# Patient Record
Sex: FEMALE | Race: WHITE | Employment: FULL TIME | URBAN - METROPOLITAN AREA
[De-identification: names, ages, dates, MRNs, and addresses within clinical notes are randomized per-mention and may not be internally consistent; named-entity substitution may affect disease eponyms.]

---

## 2023-06-09 LAB
ABO GROUP (TYPE) IN BLOOD: NORMAL
ANTIBODY SCREEN: NORMAL
ERYTHROCYTE DISTRIBUTION WIDTH (RATIO) BY AUTOMATED COUNT: 11.9 % (ref 11.5–14.5)
ERYTHROCYTE MEAN CORPUSCULAR HEMOGLOBIN CONCENTRATION (G/DL) BY AUTOMATED: 33.4 G/DL (ref 32–36)
ERYTHROCYTE MEAN CORPUSCULAR VOLUME (FL) BY AUTOMATED COUNT: 88 FL (ref 80–100)
ERYTHROCYTES (10*6/UL) IN BLOOD BY AUTOMATED COUNT: 5.08 X10E12/L (ref 4–5.2)
HEMATOCRIT (%) IN BLOOD BY AUTOMATED COUNT: 44.9 % (ref 36–46)
HEMOGLOBIN (G/DL) IN BLOOD: 15 G/DL (ref 12–16)
HEPATITIS B VIRUS SURFACE AG PRESENCE IN SERUM: NONREACTIVE
HEPATITIS C VIRUS AB PRESENCE IN SERUM: NONREACTIVE
HIV 1/ 2 AG/AB SCREEN: NONREACTIVE
LEUKOCYTES (10*3/UL) IN BLOOD BY AUTOMATED COUNT: 7.1 X10E9/L (ref 4.4–11.3)
NRBC (PER 100 WBCS) BY AUTOMATED COUNT: 0 /100 WBC (ref 0–0)
PLATELETS (10*3/UL) IN BLOOD AUTOMATED COUNT: 280 X10E9/L (ref 150–450)
REFLEX ADDED, ANEMIA PANEL: NORMAL
RH FACTOR: NORMAL
RUBELLA VIRUS IGG AB: POSITIVE
SYPHILIS TOTAL AB: NONREACTIVE

## 2023-07-13 LAB — URINE CULTURE: NORMAL

## 2023-07-14 LAB
CHLAMYDIA TRACH., AMPLIFIED: NEGATIVE
N. GONORRHEA, AMPLIFIED: NEGATIVE

## 2023-08-09 LAB — THYROTROPIN (MIU/L) IN SER/PLAS BY DETECTION LIMIT <= 0.05 MIU/L: 2.14 MIU/L (ref 0.44–3.98)

## 2023-08-12 LAB — LAB MOLECULAR CA TECHNICAL NOTES: NORMAL

## 2023-09-11 ENCOUNTER — HOSPITAL ENCOUNTER (OUTPATIENT)
Dept: DATA CONVERSION | Facility: HOSPITAL | Age: 33
Discharge: HOME | End: 2023-09-11
Payer: COMMERCIAL

## 2023-09-11 DIAGNOSIS — M79.604 PAIN IN RIGHT LEG: ICD-10-CM

## 2023-09-14 VITALS — BODY MASS INDEX: 29.87 KG/M2 | DIASTOLIC BLOOD PRESSURE: 68 MMHG | WEIGHT: 162 LBS | SYSTOLIC BLOOD PRESSURE: 116 MMHG

## 2023-09-14 NOTE — PROGRESS NOTES
Initially abstracting data into [facility] pending formal care plan by Dr. Mchugh

## 2023-09-26 DIAGNOSIS — Z34.83 PRENATAL CARE, SUBSEQUENT PREGNANCY IN THIRD TRIMESTER (HHS-HCC): Primary | ICD-10-CM

## 2023-09-29 ENCOUNTER — LAB (OUTPATIENT)
Dept: LAB | Facility: LAB | Age: 33
End: 2023-09-29
Payer: COMMERCIAL

## 2023-09-29 LAB — TOBACCO SCREEN, URINE: NEGATIVE

## 2023-10-06 PROBLEM — N91.2 AMENORRHEA: Status: ACTIVE | Noted: 2023-10-06

## 2023-10-06 PROBLEM — E66.9 OBESITY WITH BODY MASS INDEX 30 OR GREATER: Status: ACTIVE | Noted: 2023-10-06

## 2023-10-06 PROBLEM — N93.0 POSTCOITAL BLEEDING: Status: ACTIVE | Noted: 2023-10-06

## 2023-10-06 PROBLEM — N39.0 URINARY TRACT INFECTION: Status: ACTIVE | Noted: 2023-10-06

## 2023-10-06 PROBLEM — E03.9 HYPOTHYROIDISM: Status: ACTIVE | Noted: 2020-01-21

## 2023-10-06 PROBLEM — R35.0 URINARY FREQUENCY: Status: ACTIVE | Noted: 2023-10-06

## 2023-10-06 RX ORDER — CHOLECALCIFEROL (VITAMIN D3) 50 MCG
2000 TABLET ORAL DAILY
COMMUNITY
Start: 2023-01-16

## 2023-10-06 RX ORDER — PHENTERMINE HYDROCHLORIDE 37.5 MG/1
37.5 CAPSULE ORAL DAILY
COMMUNITY
Start: 2022-06-21 | End: 2023-11-08 | Stop reason: ENTERED-IN-ERROR

## 2023-10-06 RX ORDER — SEMAGLUTIDE 1.34 MG/ML
INJECTION, SOLUTION SUBCUTANEOUS
COMMUNITY
Start: 2022-10-12 | End: 2023-11-08 | Stop reason: ALTCHOICE

## 2023-10-06 RX ORDER — METFORMIN HYDROCHLORIDE 500 MG/1
500 TABLET ORAL 2 TIMES DAILY
COMMUNITY
Start: 2022-11-02 | End: 2023-11-08 | Stop reason: ALTCHOICE

## 2023-10-06 RX ORDER — MAGNESIUM 200 MG
TABLET ORAL
COMMUNITY
Start: 2023-01-16

## 2023-10-06 RX ORDER — THYROID 15 MG/1
15 TABLET ORAL
COMMUNITY

## 2023-10-06 RX ORDER — PHENTERMINE AND TOPIRAMATE 3.75; 23 MG/1; MG/1
1 CAPSULE, EXTENDED RELEASE ORAL DAILY
COMMUNITY
Start: 2022-07-05 | End: 2023-11-08 | Stop reason: ALTCHOICE

## 2023-10-06 RX ORDER — THYROID 30 MG/1
1.5 TABLET ORAL NIGHTLY
COMMUNITY
End: 2023-11-22 | Stop reason: SDUPTHER

## 2023-10-11 ENCOUNTER — ROUTINE PRENATAL (OUTPATIENT)
Dept: OBSTETRICS AND GYNECOLOGY | Facility: CLINIC | Age: 33
End: 2023-10-11
Payer: COMMERCIAL

## 2023-10-11 VITALS — DIASTOLIC BLOOD PRESSURE: 68 MMHG | WEIGHT: 165 LBS | SYSTOLIC BLOOD PRESSURE: 118 MMHG | BODY MASS INDEX: 31.18 KG/M2

## 2023-10-11 DIAGNOSIS — Z3A.24 24 WEEKS GESTATION OF PREGNANCY (HHS-HCC): ICD-10-CM

## 2023-10-11 PROCEDURE — 0501F PRENATAL FLOW SHEET: CPT | Performed by: OBSTETRICS & GYNECOLOGY

## 2023-10-11 NOTE — PROGRESS NOTES
"Subjective   Patient ID [id number]   Luisa Burger is a 32 y.o.  at 24w3d with a working estimated date of delivery of 2024, by Last Menstrual Period who presents for a routine prenatal visit. She denies vaginal bleeding, leakage of fluid, decreased fetal movements, or contractions.    Her pregnancy is complicated by:  History of mild shoulder dystocia.  We reviewed pros and cons of attempted vaginal delivery versus elective .  Her most recent ultrasound showed fetus at the 46 percentile.  She is interested in proceeding with vaginal deliveries.  Will schedule induction at 39 weeks.    Objective   Physical Exam  Weight: 74.8 kg (165 lb)  Expected Total Weight Gain: 7 kg (15 lb)-11.5 kg (25 lb)   Pregravid BMI: 29.30  BP: 118/68  Fetal Heart Rate: 143 Fundal Height (cm): 24 cm    Prenatal Labs  Urine dip:  Lab Results   Component Value Date    KETONESU NEGATIVE 2023       Lab Results   Component Value Date    HGB 15.0 2023    HCT 44.9 2023    HEPBSAG NONREACTIVE 2023     No results found for: \"PAPPA\", \"AFP\", \"HCG\", \"ESTRIOL\", \"INHBA\"  No results found for: \"GLUF\", \"GLUT1\", \"XMBEUKC4JY\", \"PRDOTYC0RX\"    Imaging  The most recent ultrasound was performed on   with a study GA of   and EFW of  .          Assessment/Plan   Glucola.    Review of recent ultrasound.  Normal.  Follow-up as clinically indicated.  46 percentile    Continue prenatal vitamin.  Labs reviewed.  Rhogam not indicated  GTT .    Follow up in 4 weeks for a routine prenatal visit.  "

## 2023-11-06 ENCOUNTER — LAB (OUTPATIENT)
Dept: LAB | Facility: LAB | Age: 33
End: 2023-11-06
Payer: COMMERCIAL

## 2023-11-06 DIAGNOSIS — Z34.83 PRENATAL CARE, SUBSEQUENT PREGNANCY IN THIRD TRIMESTER (HHS-HCC): ICD-10-CM

## 2023-11-06 LAB — GLUCOSE 1H P 50 G GLC PO SERPL-MCNC: 128 MG/DL

## 2023-11-06 PROCEDURE — 82947 ASSAY GLUCOSE BLOOD QUANT: CPT

## 2023-11-06 PROCEDURE — 36415 COLL VENOUS BLD VENIPUNCTURE: CPT

## 2023-11-08 ENCOUNTER — ANCILLARY PROCEDURE (OUTPATIENT)
Dept: OBSTETRICS AND GYNECOLOGY | Facility: CLINIC | Age: 33
End: 2023-11-08
Payer: COMMERCIAL

## 2023-11-08 ENCOUNTER — ROUTINE PRENATAL (OUTPATIENT)
Dept: OBSTETRICS AND GYNECOLOGY | Facility: CLINIC | Age: 33
End: 2023-11-08
Payer: COMMERCIAL

## 2023-11-08 ENCOUNTER — PHARMACY VISIT (OUTPATIENT)
Dept: PHARMACY | Facility: CLINIC | Age: 33
End: 2023-11-08
Payer: COMMERCIAL

## 2023-11-08 VITALS — DIASTOLIC BLOOD PRESSURE: 68 MMHG | WEIGHT: 170 LBS | SYSTOLIC BLOOD PRESSURE: 118 MMHG | BODY MASS INDEX: 32.12 KG/M2

## 2023-11-08 DIAGNOSIS — Z3A.28 28 WEEKS GESTATION OF PREGNANCY (HHS-HCC): ICD-10-CM

## 2023-11-08 DIAGNOSIS — O99.210 OBESITY IN PREGNANCY (HHS-HCC): ICD-10-CM

## 2023-11-08 LAB
POC BLOOD, URINE: NEGATIVE
POC GLUCOSE, URINE: NEGATIVE MG/DL
POC LEUKOCYTES, URINE: NEGATIVE
POC NITRITE,URINE: NEGATIVE
POC PROTEIN, URINE: NEGATIVE MG/DL

## 2023-11-08 PROCEDURE — 81002 URINALYSIS NONAUTO W/O SCOPE: CPT | Performed by: OBSTETRICS & GYNECOLOGY

## 2023-11-08 PROCEDURE — RXMED WILLOW AMBULATORY MEDICATION CHARGE

## 2023-11-08 PROCEDURE — 0501F PRENATAL FLOW SHEET: CPT | Performed by: OBSTETRICS & GYNECOLOGY

## 2023-11-08 PROCEDURE — 76805 OB US >/= 14 WKS SNGL FETUS: CPT | Performed by: OBSTETRICS & GYNECOLOGY

## 2023-11-08 PROCEDURE — 76816 OB US FOLLOW-UP PER FETUS: CPT | Performed by: OBSTETRICS & GYNECOLOGY

## 2023-11-08 RX ORDER — THYROID 30 MG/1
TABLET ORAL
Qty: 135 TABLET | Refills: 10 | OUTPATIENT
Start: 2023-08-16

## 2023-11-08 NOTE — PROGRESS NOTES
"Subjective   Patient ID [id number]   Luisa Burger is a 32 y.o.  at 28w3d with a working estimated date of delivery of 2024, by Last Menstrual Period who presents for a routine prenatal visit. She denies vaginal bleeding, leakage of fluid, decreased fetal movements, or contractions.    Her pregnancy is complicated by:  Obesity in pregnancy.  Varicose veins right leg.  Negative ultrasound for thrombosis.  History of mild shoulder dystocia    Objective   Physical Exam:   Weight: 77.1 kg (170 lb)  Expected Total Weight Gain: 7 kg (15 lb)-11.5 kg (25 lb)   Pregravid BMI: 29.30  BP: 118/68                  Prenatal Labs  Urine Dip:  Lab Results   Component Value Date    KETONESU NEGATIVE 2023     Lab Results   Component Value Date    HGB 15.0 2023    HCT 44.9 2023    ABO B 2023    HEPBSAG NONREACTIVE 2023     No results found for: \"PAPPA\", \"AFP\", \"HCG\", \"ESTRIOL\", \"INHBA\"  No results found for: \"GLUF\", \"GLUT1\", \"NLWAOEB0AP\", \"SZLXCQB6ME\"    Imaging  The most recent ultrasound was performed on 2023 with a study GA of   and EFW of  .          Assessment/Plan   Reminder for flu and Tdap vaccine.  Start weekly NST 34 weeks.  Scheduled for induction at 39 weeks  Continue prenatal vitamin.  Labs reviewed.  Expected mode of delivery vaginal  Follow up in 2 week for a routine prenatal visit.  "

## 2023-11-22 ENCOUNTER — ROUTINE PRENATAL (OUTPATIENT)
Dept: OBSTETRICS AND GYNECOLOGY | Facility: CLINIC | Age: 33
End: 2023-11-22
Payer: COMMERCIAL

## 2023-11-22 VITALS — DIASTOLIC BLOOD PRESSURE: 68 MMHG | BODY MASS INDEX: 32.12 KG/M2 | WEIGHT: 170 LBS | SYSTOLIC BLOOD PRESSURE: 120 MMHG

## 2023-11-22 DIAGNOSIS — Z3A.30 30 WEEKS GESTATION OF PREGNANCY (HHS-HCC): ICD-10-CM

## 2023-11-22 PROCEDURE — 81002 URINALYSIS NONAUTO W/O SCOPE: CPT | Performed by: OBSTETRICS & GYNECOLOGY

## 2023-11-22 PROCEDURE — 0501F PRENATAL FLOW SHEET: CPT | Performed by: OBSTETRICS & GYNECOLOGY

## 2023-11-22 NOTE — PROGRESS NOTES
"Subjective   Patient ID [id number]   Luisa Burger is a 32 y.o.  at 30w3d with a working estimated date of delivery of 2024, by Last Menstrual Period who presents for a routine prenatal visit. She denies vaginal bleeding, leakage of fluid, decreased fetal movements, or contractions.    Her pregnancy is complicated by:  BMI greater than 30.  History of varicose veins.    Objective   Physical Exam:   Weight: 77.1 kg (170 lb)  Expected Total Weight Gain: 7 kg (15 lb)-11.5 kg (25 lb)   Pregravid BMI: 29.30  BP: 120/68                  Prenatal Labs  Urine Dip:  Lab Results   Component Value Date    KETONESU NEGATIVE 2023     Lab Results   Component Value Date    HGB 15.0 2023    HCT 44.9 2023    ABO B 2023    HEPBSAG NONREACTIVE 2023     No results found for: \"PAPPA\", \"AFP\", \"HCG\", \"ESTRIOL\", \"INHBA\"  No results found for: \"GLUF\", \"GLUT1\", \"NOFXTZG8ZS\", \"ARIXHTW7QH\"    Imaging  The most recent ultrasound was performed on 2023 with a study GA of   and EFW of  .          Assessment/Plan   [facility] paperwork submitted.  Routine care.  Importance of fetal movement.  Reviewed vaccinations.  Continue prenatal vitamin.  Labs reviewed.  GBS taken.  Expected mode of delivery vaginal  Follow up in 1 week for a routine prenatal visit.  "

## 2023-12-06 ENCOUNTER — PHARMACY VISIT (OUTPATIENT)
Dept: PHARMACY | Facility: CLINIC | Age: 33
End: 2023-12-06
Payer: COMMERCIAL

## 2023-12-06 ENCOUNTER — ROUTINE PRENATAL (OUTPATIENT)
Dept: OBSTETRICS AND GYNECOLOGY | Facility: CLINIC | Age: 33
End: 2023-12-06
Payer: COMMERCIAL

## 2023-12-06 VITALS — SYSTOLIC BLOOD PRESSURE: 102 MMHG | BODY MASS INDEX: 32.69 KG/M2 | WEIGHT: 173 LBS | DIASTOLIC BLOOD PRESSURE: 70 MMHG

## 2023-12-06 DIAGNOSIS — Z3A.32 32 WEEKS GESTATION OF PREGNANCY (HHS-HCC): ICD-10-CM

## 2023-12-06 PROCEDURE — RXMED WILLOW AMBULATORY MEDICATION CHARGE

## 2023-12-06 PROCEDURE — 0501F PRENATAL FLOW SHEET: CPT | Performed by: OBSTETRICS & GYNECOLOGY

## 2023-12-06 PROCEDURE — 81002 URINALYSIS NONAUTO W/O SCOPE: CPT | Performed by: OBSTETRICS & GYNECOLOGY

## 2023-12-06 NOTE — PROGRESS NOTES
"Subjective   Patient ID [id number]   Luisa Burger is a 32 y.o.  at 32w3d with a working estimated date of delivery of 2024, by Last Menstrual Period who presents for a routine prenatal visit. She denies vaginal bleeding, leakage of fluid, decreased fetal movements, or contractions.    Her pregnancy is complicated by:  BMI greater than 30.  Start weekly NST 34 weeks.  Reminder for Tdap vaccination.    Objective   Physical Exam:   Weight: 78.5 kg (173 lb)  Expected Total Weight Gain: 7 kg (15 lb)-11.5 kg (25 lb)   Pregravid BMI: 29.30  BP: 102/70                  Prenatal Labs  Urine Dip:  Lab Results   Component Value Date    KETONESU NEGATIVE 2023     Lab Results   Component Value Date    HGB 15.0 2023    HCT 44.9 2023    ABO B 2023    HEPBSAG NONREACTIVE 2023     No results found for: \"PAPPA\", \"AFP\", \"HCG\", \"ESTRIOL\", \"INHBA\"  No results found for: \"GLUF\", \"GLUT1\", \"BKGDFGI7WY\", \"JOBEGEQ7MR\"    Imaging  The most recent ultrasound was performed on 2023 with a study GA of   and EFW of  .          Assessment/Plan   History of varicose veins.  Start weekly NST at 34 weeks.  GBS at 36 weeks  Continue prenatal vitamin.  Labs reviewed.  GBS taken.  Expected mode of delivery vaginal  Follow up in 2  week for a routine prenatal visit.  "

## 2023-12-20 ENCOUNTER — APPOINTMENT (OUTPATIENT)
Dept: OBSTETRICS AND GYNECOLOGY | Facility: CLINIC | Age: 33
End: 2023-12-20
Payer: COMMERCIAL

## 2023-12-28 ENCOUNTER — ROUTINE PRENATAL (OUTPATIENT)
Dept: OBSTETRICS AND GYNECOLOGY | Facility: CLINIC | Age: 33
End: 2023-12-28
Payer: COMMERCIAL

## 2023-12-28 VITALS — SYSTOLIC BLOOD PRESSURE: 104 MMHG | BODY MASS INDEX: 32.12 KG/M2 | DIASTOLIC BLOOD PRESSURE: 64 MMHG | WEIGHT: 170 LBS

## 2023-12-28 DIAGNOSIS — Z36.89 NST (NON-STRESS TEST) REACTIVE (HHS-HCC): ICD-10-CM

## 2023-12-28 DIAGNOSIS — Z34.83 SUPERVISION OF NORMAL INTRAUTERINE PREGNANCY IN MULTIGRAVIDA, THIRD TRIMESTER (HHS-HCC): ICD-10-CM

## 2023-12-28 PROCEDURE — 81002 URINALYSIS NONAUTO W/O SCOPE: CPT | Performed by: MIDWIFE

## 2023-12-28 PROCEDURE — 0501F PRENATAL FLOW SHEET: CPT | Performed by: MIDWIFE

## 2023-12-28 PROCEDURE — 59025 FETAL NON-STRESS TEST: CPT | Performed by: MIDWIFE

## 2023-12-28 NOTE — PROGRESS NOTES
Subjective   Patient ID [id number]   Luisa Burger is a 33 y.o.  at 35w4d with a working estimated date of delivery of 2024, by Last Menstrual Period who presents for a routine prenatal visit. She denies vaginal bleeding, leakage of fluid, decreased fetal movements, or contractions.    Her pregnancy is complicated by:  BMI>30    Objective   Physical Exam:   Weight: 77.1 kg (170 lb)  Expected Total Weight Gain: 7 kg (15 lb)-11.5 kg (25 lb)   Pregravid BMI: 29.30  BP: 104/64  Fetal Heart Rate: NST Fundal Height (cm): 35 cm Presentation: Breech           Prenatal Labs  Urine Dip:  Lab Results   Component Value Date    KETONESU NEGATIVE 2023     Lab Results   Component Value Date    HGB 15.0 2023    HCT 44.9 2023    ABO B 2023    HEPBSAG NONREACTIVE 2023     Imaging  The most recent ultrasound was performed on 2023 with a study GA of   and EFW of  .          Assessment/Plan   Diagnoses and all orders for this visit:  Obesity complicating childbirth  Supervision of normal intrauterine pregnancy in multigravida, third trimester  -     POCT UA (nonautomated) manually resulted  -     Fetal nonstress test  NST (non-stress test) reactive  -     Fetal nonstress test    Continue prenatal vitamin.  Labs reviewed.    Expected mode of delivery to be determine based of presentation at term  Importance of daily FM, warning s/sx reviewed  Follow up in 1 week for a routine prenatal visit.

## 2023-12-28 NOTE — PROCEDURES
Luisa Burger, a  at 35w4d with an ROD of 2024, by Last Menstrual Period, was seen at [facility] for a nonstress test.    Non-Stress Test   Baseline Fetal Heart Rate for Non-Stress Test: 130 BPM  Accelerations in Non-Stress Test: Yes, greater than/equal to 15 bpm, lasting at least 15 seconds  Decelerations in Non-Stress Test: None  Interpretation of Non-Stress Test   Interpretation of Non-Stress Test: Reactive

## 2024-01-04 ENCOUNTER — ROUTINE PRENATAL (OUTPATIENT)
Dept: OBSTETRICS AND GYNECOLOGY | Facility: CLINIC | Age: 34
End: 2024-01-04
Payer: COMMERCIAL

## 2024-01-04 VITALS — WEIGHT: 172 LBS | SYSTOLIC BLOOD PRESSURE: 116 MMHG | DIASTOLIC BLOOD PRESSURE: 70 MMHG | BODY MASS INDEX: 32.5 KG/M2

## 2024-01-04 DIAGNOSIS — Z34.83 MULTIGRAVIDA IN THIRD TRIMESTER (HHS-HCC): ICD-10-CM

## 2024-01-04 DIAGNOSIS — Z34.83 PRENATAL CARE, SUBSEQUENT PREGNANCY, THIRD TRIMESTER (HHS-HCC): ICD-10-CM

## 2024-01-04 DIAGNOSIS — R30.0 DYSURIA: ICD-10-CM

## 2024-01-04 DIAGNOSIS — O99.210 OBESITY IN PREGNANCY (HHS-HCC): Primary | ICD-10-CM

## 2024-01-04 DIAGNOSIS — Z36.89 NON-STRESS TEST REACTIVE (HHS-HCC): ICD-10-CM

## 2024-01-04 LAB
POC BLOOD, URINE: ABNORMAL
POC GLUCOSE, URINE: NEGATIVE MG/DL
POC LEUKOCYTES, URINE: ABNORMAL
POC NITRITE,URINE: NEGATIVE
POC PROTEIN, URINE: ABNORMAL MG/DL

## 2024-01-04 PROCEDURE — 81002 URINALYSIS NONAUTO W/O SCOPE: CPT | Performed by: MIDWIFE

## 2024-01-04 PROCEDURE — 59025 FETAL NON-STRESS TEST: CPT | Performed by: MIDWIFE

## 2024-01-04 PROCEDURE — 87081 CULTURE SCREEN ONLY: CPT

## 2024-01-04 PROCEDURE — 0501F PRENATAL FLOW SHEET: CPT | Performed by: MIDWIFE

## 2024-01-04 PROCEDURE — 87086 URINE CULTURE/COLONY COUNT: CPT

## 2024-01-04 NOTE — PROCEDURES
Luisaleeanne Burger, a  at 36w4d with an ROD of 2024, by Last Menstrual Period, was seen at [facility] for a nonstress test.    Non-Stress Test   Baseline Fetal Heart Rate for Non-Stress Test: 130 BPM  Variability in Waveform for Non-Stress Test: Moderate  Accelerations in Non-Stress Test: Yes, greater than/equal to 15 bpm, lasting at least 15 seconds  Decelerations in Non-Stress Test: None  Interpretation of Non-Stress Test   Interpretation of Non-Stress Test: Reactive

## 2024-01-04 NOTE — PROGRESS NOTES
"Subjective   Patient ID [id number]   Luisa Burger is a 33 y.o.  at 36w4d with a working estimated date of delivery of 2024, by Last Menstrual Period who presents for a routine prenatal visit. She denies vaginal bleeding, leakage of fluid, decreased fetal movements, or contractions.    Her pregnancy is complicated by:  Breech; BMI >30    Objective   Physical Exam:   Weight: 78 kg (172 lb)  Expected Total Weight Gain: 7 kg (15 lb)-11.5 kg (25 lb)   Pregravid BMI: 29.30  BP: 116/70  Fetal Heart Rate: NST Fundal Height (cm): 36 cm Presentation: Breech           Prenatal Labs  Urine Dip:  Lab Results   Component Value Date    KETONESU NEGATIVE 2023     Lab Results   Component Value Date    HGB 15.0 2023    HCT 44.9 2023    ABO B 2023    HEPBSAG NONREACTIVE 2023     No results found for: \"PAPPA\", \"AFP\", \"HCG\", \"ESTRIOL\", \"INHBA\"  No results found for: \"GLUF\", \"GLUT1\", \"BTMZCSN5UF\", \"SVAZLQR5PM\"    Imaging  The most recent ultrasound was performed on 2023 with a study GA of   and EFW of  .          Assessment/Plan   Diagnoses and all orders for this visit:  Obesity in pregnancy  Dysuria  -     Urine Culture  Multigravida in third trimester  -     POCT UA (nonautomated) manually resulted  -     Fetal nonstress test  Non-stress test reactive    Continue prenatal vitamin.  Labs reviewed.  GBS taken.  Expected mode of delivery to be determined   Importance of daily Fetal Movement awareness, labor and warning s/sx reviewed    Follow up in 1 week for a routine prenatal visit.  "

## 2024-01-05 LAB — BACTERIA UR CULT: NORMAL

## 2024-01-08 LAB — GP B STREP GENITAL QL CULT: NORMAL

## 2024-01-10 ENCOUNTER — ROUTINE PRENATAL (OUTPATIENT)
Dept: OBSTETRICS AND GYNECOLOGY | Facility: CLINIC | Age: 34
End: 2024-01-10
Payer: COMMERCIAL

## 2024-01-10 VITALS — BODY MASS INDEX: 32.5 KG/M2 | WEIGHT: 172 LBS | DIASTOLIC BLOOD PRESSURE: 70 MMHG | SYSTOLIC BLOOD PRESSURE: 126 MMHG

## 2024-01-10 DIAGNOSIS — Z3A.37 37 WEEKS GESTATION OF PREGNANCY (HHS-HCC): ICD-10-CM

## 2024-01-10 PROCEDURE — 59025 FETAL NON-STRESS TEST: CPT | Performed by: OBSTETRICS & GYNECOLOGY

## 2024-01-10 PROCEDURE — 0501F PRENATAL FLOW SHEET: CPT | Performed by: OBSTETRICS & GYNECOLOGY

## 2024-01-10 PROCEDURE — 81002 URINALYSIS NONAUTO W/O SCOPE: CPT | Performed by: OBSTETRICS & GYNECOLOGY

## 2024-01-10 NOTE — PROGRESS NOTES
"Subjective   Patient ID [id number]   Luisa Burger is a 33 y.o.  at 37w3d with a working estimated date of delivery of 2024, by Last Menstrual Period who presents for a routine prenatal visit. She denies vaginal bleeding, leakage of fluid, decreased fetal movements, or contractions.    Her pregnancy is complicated by:  BMI greater than 30.  Nonstress test reactive.  For vaginal exam next visit and confirm presentation.  Scheduled for induction .  History mild shoulder dystocia with 8 pound infant    Objective   Physical Exam:   Weight: 78 kg (172 lb)  Expected Total Weight Gain: 7 kg (15 lb)-11.5 kg (25 lb)   Pregravid BMI: 29.30  BP: 126/70                  Prenatal Labs  Urine Dip:  Lab Results   Component Value Date    KETONESU NEGATIVE 2023     Lab Results   Component Value Date    HGB 15.0 2023    HCT 44.9 2023    ABO B 2023    HEPBSAG NONREACTIVE 2023     No results found for: \"PAPPA\", \"AFP\", \"HCG\", \"ESTRIOL\", \"INHBA\"  No results found for: \"GLUF\", \"GLUT1\", \"ZEPRVHV6BL\", \"BHMWNHE6EV\"    Imaging  The most recent ultrasound was performed on 2023 with a study GA of   and EFW of  .          Assessment/Plan     Continue prenatal vitamin.  Labs reviewed.  GBS taken.  GBS negative  Expected mode of delivery vaginal  Follow up in 1 week for a routine prenatal visit.  "

## 2024-01-12 ENCOUNTER — APPOINTMENT (OUTPATIENT)
Dept: OBSTETRICS AND GYNECOLOGY | Facility: CLINIC | Age: 34
End: 2024-01-12
Payer: COMMERCIAL

## 2024-01-13 ENCOUNTER — PHARMACY VISIT (OUTPATIENT)
Dept: PHARMACY | Facility: CLINIC | Age: 34
End: 2024-01-13
Payer: COMMERCIAL

## 2024-01-13 PROCEDURE — RXMED WILLOW AMBULATORY MEDICATION CHARGE

## 2024-01-17 ENCOUNTER — ROUTINE PRENATAL (OUTPATIENT)
Dept: OBSTETRICS AND GYNECOLOGY | Facility: CLINIC | Age: 34
End: 2024-01-17
Payer: COMMERCIAL

## 2024-01-17 VITALS — SYSTOLIC BLOOD PRESSURE: 112 MMHG | BODY MASS INDEX: 32.12 KG/M2 | WEIGHT: 170 LBS | DIASTOLIC BLOOD PRESSURE: 70 MMHG

## 2024-01-17 DIAGNOSIS — O99.210 OBESITY IN PREGNANCY (HHS-HCC): ICD-10-CM

## 2024-01-17 DIAGNOSIS — Z3A.38 38 WEEKS GESTATION OF PREGNANCY (HHS-HCC): ICD-10-CM

## 2024-01-17 PROCEDURE — 59025 FETAL NON-STRESS TEST: CPT | Performed by: OBSTETRICS & GYNECOLOGY

## 2024-01-17 PROCEDURE — 0501F PRENATAL FLOW SHEET: CPT | Performed by: OBSTETRICS & GYNECOLOGY

## 2024-01-17 NOTE — PROGRESS NOTES
"Subjective   Patient ID [id number]   Luisa Burger is a 33 y.o.  at 38w3d with a working estimated date of delivery of 2024, by Last Menstrual Period who presents for a routine prenatal visit. She denies vaginal bleeding, leakage of fluid, decreased fetal movements, or contractions.    Her pregnancy is complicated by:  BMI greater than 30.  Nonstress test reactive.    Objective   Physical Exam:   Weight: 77.1 kg (170 lb)  Expected Total Weight Gain: 7 kg (15 lb)-11.5 kg (25 lb)   Pregravid BMI: 29.30  BP: 112/70                  Prenatal Labs  Urine Dip:  Lab Results   Component Value Date    KETONESU NEGATIVE 2023     Lab Results   Component Value Date    HGB 15.0 2023    HCT 44.9 2023    ABO B 2023    HEPBSAG NONREACTIVE 2023     No results found for: \"PAPPA\", \"AFP\", \"HCG\", \"ESTRIOL\", \"INHBA\"  No results found for: \"GLUF\", \"GLUT1\", \"NBMNCJT3JQ\", \"ZTMVOVS8BW\"    Imaging  The most recent ultrasound was performed on 2023 with a study GA of   and EFW of  .          Assessment/Plan   Vaginal exam.  Cervix 50% 1 cm.  Presentation confirmed  Continue prenatal vitamin.  Labs reviewed.  GBS taken.  GBS negative  Expected mode of delivery vaginal  Plan is oral Cytotec followed by Pitocin.  Epidural as needed.  Reviewed risks of fetal heart rate changes, tachysystole, need for emergent or operative delivery  "

## 2024-01-21 ENCOUNTER — HOSPITAL ENCOUNTER (INPATIENT)
Facility: HOSPITAL | Age: 34
LOS: 2 days | Discharge: HOME | End: 2024-01-23
Attending: OBSTETRICS & GYNECOLOGY | Admitting: OBSTETRICS & GYNECOLOGY
Payer: COMMERCIAL

## 2024-01-21 ENCOUNTER — ANESTHESIA (OUTPATIENT)
Dept: OBSTETRICS AND GYNECOLOGY | Facility: HOSPITAL | Age: 34
End: 2024-01-21
Payer: COMMERCIAL

## 2024-01-21 ENCOUNTER — ANESTHESIA EVENT (OUTPATIENT)
Dept: OBSTETRICS AND GYNECOLOGY | Facility: HOSPITAL | Age: 34
End: 2024-01-21
Payer: COMMERCIAL

## 2024-01-21 LAB
ABO GROUP (TYPE) IN BLOOD: NORMAL
ANTIBODY SCREEN: NORMAL
ERYTHROCYTE [DISTWIDTH] IN BLOOD BY AUTOMATED COUNT: 12.3 % (ref 11.5–14.5)
HCT VFR BLD AUTO: 42.6 % (ref 36–46)
HGB BLD-MCNC: 14.5 G/DL (ref 12–16)
MCH RBC QN AUTO: 29.9 PG (ref 26–34)
MCHC RBC AUTO-ENTMCNC: 34 G/DL (ref 32–36)
MCV RBC AUTO: 88 FL (ref 80–100)
NRBC BLD-RTO: 0 /100 WBCS (ref 0–0)
PLATELET # BLD AUTO: 185 X10*3/UL (ref 150–450)
RBC # BLD AUTO: 4.85 X10*6/UL (ref 4–5.2)
RH FACTOR (ANTIGEN D): NORMAL
WBC # BLD AUTO: 9.4 X10*3/UL (ref 4.4–11.3)

## 2024-01-21 PROCEDURE — 2500000001 HC RX 250 WO HCPCS SELF ADMINISTERED DRUGS (ALT 637 FOR MEDICARE OP): Performed by: OBSTETRICS & GYNECOLOGY

## 2024-01-21 PROCEDURE — 59400 OBSTETRICAL CARE: CPT | Performed by: OBSTETRICS & GYNECOLOGY

## 2024-01-21 PROCEDURE — 2500000004 HC RX 250 GENERAL PHARMACY W/ HCPCS (ALT 636 FOR OP/ED): Performed by: OBSTETRICS & GYNECOLOGY

## 2024-01-21 PROCEDURE — A59410 PR OBSTE CARE,VAG DELIV+POSTPARTUM: Performed by: NURSE ANESTHETIST, CERTIFIED REGISTERED

## 2024-01-21 PROCEDURE — 59409 OBSTETRICAL CARE: CPT | Performed by: OBSTETRICS & GYNECOLOGY

## 2024-01-21 PROCEDURE — 2500000004 HC RX 250 GENERAL PHARMACY W/ HCPCS (ALT 636 FOR OP/ED): Performed by: NURSE ANESTHETIST, CERTIFIED REGISTERED

## 2024-01-21 PROCEDURE — 85027 COMPLETE CBC AUTOMATED: CPT | Performed by: OBSTETRICS & GYNECOLOGY

## 2024-01-21 PROCEDURE — 36415 COLL VENOUS BLD VENIPUNCTURE: CPT | Performed by: OBSTETRICS & GYNECOLOGY

## 2024-01-21 PROCEDURE — 2500000001 HC RX 250 WO HCPCS SELF ADMINISTERED DRUGS (ALT 637 FOR MEDICARE OP)

## 2024-01-21 PROCEDURE — 1120000001 HC OB PRIVATE ROOM DAILY

## 2024-01-21 PROCEDURE — 86901 BLOOD TYPING SEROLOGIC RH(D): CPT | Performed by: OBSTETRICS & GYNECOLOGY

## 2024-01-21 PROCEDURE — 51701 INSERT BLADDER CATHETER: CPT

## 2024-01-21 RX ORDER — ONDANSETRON HYDROCHLORIDE 2 MG/ML
4 INJECTION, SOLUTION INTRAVENOUS EVERY 6 HOURS PRN
Status: DISCONTINUED | OUTPATIENT
Start: 2024-01-21 | End: 2024-01-23 | Stop reason: HOSPADM

## 2024-01-21 RX ORDER — METHYLERGONOVINE MALEATE 0.2 MG/ML
0.2 INJECTION INTRAVENOUS ONCE AS NEEDED
Status: DISCONTINUED | OUTPATIENT
Start: 2024-01-21 | End: 2024-01-23 | Stop reason: HOSPADM

## 2024-01-21 RX ORDER — NIFEDIPINE 10 MG/1
10 CAPSULE ORAL ONCE AS NEEDED
Status: DISCONTINUED | OUTPATIENT
Start: 2024-01-21 | End: 2024-01-23 | Stop reason: HOSPADM

## 2024-01-21 RX ORDER — SIMETHICONE 80 MG
80 TABLET,CHEWABLE ORAL 4 TIMES DAILY PRN
Status: DISCONTINUED | OUTPATIENT
Start: 2024-01-21 | End: 2024-01-23 | Stop reason: HOSPADM

## 2024-01-21 RX ORDER — LOPERAMIDE HYDROCHLORIDE 2 MG/1
4 CAPSULE ORAL EVERY 2 HOUR PRN
Status: DISCONTINUED | OUTPATIENT
Start: 2024-01-21 | End: 2024-01-23 | Stop reason: HOSPADM

## 2024-01-21 RX ORDER — METOCLOPRAMIDE 10 MG/1
10 TABLET ORAL EVERY 6 HOURS PRN
Status: DISCONTINUED | OUTPATIENT
Start: 2024-01-21 | End: 2024-01-23 | Stop reason: HOSPADM

## 2024-01-21 RX ORDER — CARBOPROST TROMETHAMINE 250 UG/ML
250 INJECTION, SOLUTION INTRAMUSCULAR ONCE AS NEEDED
Status: DISCONTINUED | OUTPATIENT
Start: 2024-01-21 | End: 2024-01-23 | Stop reason: HOSPADM

## 2024-01-21 RX ORDER — MISOPROSTOL 200 UG/1
800 TABLET ORAL ONCE AS NEEDED
Status: DISCONTINUED | OUTPATIENT
Start: 2024-01-21 | End: 2024-01-23 | Stop reason: HOSPADM

## 2024-01-21 RX ORDER — THYROID 30 MG/1
15 TABLET ORAL
Status: DISCONTINUED | OUTPATIENT
Start: 2024-01-22 | End: 2024-01-22

## 2024-01-21 RX ORDER — LABETALOL HYDROCHLORIDE 5 MG/ML
20 INJECTION, SOLUTION INTRAVENOUS ONCE AS NEEDED
Status: DISCONTINUED | OUTPATIENT
Start: 2024-01-21 | End: 2024-01-23 | Stop reason: HOSPADM

## 2024-01-21 RX ORDER — OXYTOCIN/0.9 % SODIUM CHLORIDE 30/500 ML
60 PLASTIC BAG, INJECTION (ML) INTRAVENOUS ONCE AS NEEDED
Status: DISCONTINUED | OUTPATIENT
Start: 2024-01-21 | End: 2024-01-23 | Stop reason: HOSPADM

## 2024-01-21 RX ORDER — ONDANSETRON 4 MG/1
4 TABLET, FILM COATED ORAL EVERY 6 HOURS PRN
Status: DISCONTINUED | OUTPATIENT
Start: 2024-01-21 | End: 2024-01-23 | Stop reason: HOSPADM

## 2024-01-21 RX ORDER — TERBUTALINE SULFATE 1 MG/ML
0.25 INJECTION SUBCUTANEOUS ONCE AS NEEDED
Status: DISCONTINUED | OUTPATIENT
Start: 2024-01-21 | End: 2024-01-23 | Stop reason: HOSPADM

## 2024-01-21 RX ORDER — OXYTOCIN/0.9 % SODIUM CHLORIDE 30/500 ML
2-30 PLASTIC BAG, INJECTION (ML) INTRAVENOUS CONTINUOUS
Status: DISCONTINUED | OUTPATIENT
Start: 2024-01-21 | End: 2024-01-23 | Stop reason: HOSPADM

## 2024-01-21 RX ORDER — OXYTOCIN 10 [USP'U]/ML
10 INJECTION, SOLUTION INTRAMUSCULAR; INTRAVENOUS ONCE AS NEEDED
Status: DISCONTINUED | OUTPATIENT
Start: 2024-01-21 | End: 2024-01-23 | Stop reason: HOSPADM

## 2024-01-21 RX ORDER — SODIUM CHLORIDE, SODIUM LACTATE, POTASSIUM CHLORIDE, CALCIUM CHLORIDE 600; 310; 30; 20 MG/100ML; MG/100ML; MG/100ML; MG/100ML
125 INJECTION, SOLUTION INTRAVENOUS CONTINUOUS
Status: DISCONTINUED | OUTPATIENT
Start: 2024-01-21 | End: 2024-01-23 | Stop reason: HOSPADM

## 2024-01-21 RX ORDER — BUPIVACAINE HYDROCHLORIDE 2.5 MG/ML
INJECTION, SOLUTION EPIDURAL; INFILTRATION; INTRACAUDAL AS NEEDED
Status: DISCONTINUED | OUTPATIENT
Start: 2024-01-21 | End: 2024-01-21

## 2024-01-21 RX ORDER — IBUPROFEN 600 MG/1
600 TABLET ORAL EVERY 6 HOURS
Status: DISCONTINUED | OUTPATIENT
Start: 2024-01-21 | End: 2024-01-23 | Stop reason: HOSPADM

## 2024-01-21 RX ORDER — DIPHENHYDRAMINE HYDROCHLORIDE 50 MG/ML
25 INJECTION INTRAMUSCULAR; INTRAVENOUS EVERY 6 HOURS PRN
Status: DISCONTINUED | OUTPATIENT
Start: 2024-01-21 | End: 2024-01-23 | Stop reason: HOSPADM

## 2024-01-21 RX ORDER — LIDOCAINE 560 MG/1
1 PATCH PERCUTANEOUS; TOPICAL; TRANSDERMAL
Status: DISCONTINUED | OUTPATIENT
Start: 2024-01-21 | End: 2024-01-23 | Stop reason: HOSPADM

## 2024-01-21 RX ORDER — NALBUPHINE HYDROCHLORIDE 10 MG/ML
10 INJECTION, SOLUTION INTRAMUSCULAR; INTRAVENOUS; SUBCUTANEOUS
Status: DISCONTINUED | OUTPATIENT
Start: 2024-01-21 | End: 2024-01-23 | Stop reason: HOSPADM

## 2024-01-21 RX ORDER — LIDOCAINE HYDROCHLORIDE 10 MG/ML
30 INJECTION INFILTRATION; PERINEURAL ONCE AS NEEDED
Status: DISCONTINUED | OUTPATIENT
Start: 2024-01-21 | End: 2024-01-23 | Stop reason: HOSPADM

## 2024-01-21 RX ORDER — BUTORPHANOL TARTRATE 2 MG/ML
1 INJECTION INTRAMUSCULAR; INTRAVENOUS EVERY 10 MIN PRN
Status: DISCONTINUED | OUTPATIENT
Start: 2024-01-21 | End: 2024-01-23 | Stop reason: HOSPADM

## 2024-01-21 RX ORDER — METOCLOPRAMIDE HYDROCHLORIDE 5 MG/ML
10 INJECTION INTRAMUSCULAR; INTRAVENOUS EVERY 6 HOURS PRN
Status: DISCONTINUED | OUTPATIENT
Start: 2024-01-21 | End: 2024-01-23 | Stop reason: HOSPADM

## 2024-01-21 RX ORDER — HYDRALAZINE HYDROCHLORIDE 20 MG/ML
5 INJECTION INTRAMUSCULAR; INTRAVENOUS ONCE AS NEEDED
Status: DISCONTINUED | OUTPATIENT
Start: 2024-01-21 | End: 2024-01-23 | Stop reason: HOSPADM

## 2024-01-21 RX ORDER — ACETAMINOPHEN 325 MG/1
975 TABLET ORAL EVERY 6 HOURS
Status: DISCONTINUED | OUTPATIENT
Start: 2024-01-21 | End: 2024-01-23 | Stop reason: HOSPADM

## 2024-01-21 RX ORDER — POLYETHYLENE GLYCOL 3350 17 G/17G
17 POWDER, FOR SOLUTION ORAL 2 TIMES DAILY PRN
Status: DISCONTINUED | OUTPATIENT
Start: 2024-01-21 | End: 2024-01-23 | Stop reason: HOSPADM

## 2024-01-21 RX ORDER — DIPHENHYDRAMINE HCL 25 MG
25 CAPSULE ORAL EVERY 6 HOURS PRN
Status: DISCONTINUED | OUTPATIENT
Start: 2024-01-21 | End: 2024-01-23 | Stop reason: HOSPADM

## 2024-01-21 RX ORDER — FENTANYL/ROPIVACAINE/NS/PF 2MCG/ML-.2
0-25 PLASTIC BAG, INJECTION (ML) INJECTION CONTINUOUS
Status: DISCONTINUED | OUTPATIENT
Start: 2024-01-21 | End: 2024-01-23 | Stop reason: HOSPADM

## 2024-01-21 RX ORDER — TRANEXAMIC ACID 100 MG/ML
1000 INJECTION, SOLUTION INTRAVENOUS ONCE AS NEEDED
Status: DISCONTINUED | OUTPATIENT
Start: 2024-01-21 | End: 2024-01-23 | Stop reason: HOSPADM

## 2024-01-21 RX ORDER — BISACODYL 10 MG/1
10 SUPPOSITORY RECTAL DAILY PRN
Status: DISCONTINUED | OUTPATIENT
Start: 2024-01-21 | End: 2024-01-23 | Stop reason: HOSPADM

## 2024-01-21 RX ORDER — ADHESIVE BANDAGE
10 BANDAGE TOPICAL
Status: DISCONTINUED | OUTPATIENT
Start: 2024-01-21 | End: 2024-01-23 | Stop reason: HOSPADM

## 2024-01-21 RX ADMIN — Medication 2 MILLI-UNITS/MIN: at 16:47

## 2024-01-21 RX ADMIN — IBUPROFEN 600 MG: 600 TABLET, FILM COATED ORAL at 22:31

## 2024-01-21 RX ADMIN — MISOPROSTOL 25 MCG: 100 TABLET ORAL at 10:40

## 2024-01-21 RX ADMIN — MISOPROSTOL 25 MCG: 100 TABLET ORAL at 12:49

## 2024-01-21 RX ADMIN — Medication 15 ML/HR: at 18:25

## 2024-01-21 RX ADMIN — BUPIVACAINE HYDROCHLORIDE 8 ML: 2.5 INJECTION, SOLUTION EPIDURAL; INFILTRATION; INTRACAUDAL; PERINEURAL at 18:15

## 2024-01-21 RX ADMIN — BUPIVACAINE HYDROCHLORIDE 4 ML: 2.5 INJECTION, SOLUTION EPIDURAL; INFILTRATION; INTRACAUDAL; PERINEURAL at 18:23

## 2024-01-21 RX ADMIN — BUPIVACAINE HYDROCHLORIDE 4 ML: 2.5 INJECTION, SOLUTION EPIDURAL; INFILTRATION; INTRACAUDAL; PERINEURAL at 18:20

## 2024-01-21 RX ADMIN — SODIUM CHLORIDE, POTASSIUM CHLORIDE, SODIUM LACTATE AND CALCIUM CHLORIDE 125 ML/HR: 600; 310; 30; 20 INJECTION, SOLUTION INTRAVENOUS at 10:30

## 2024-01-21 RX ADMIN — SODIUM CHLORIDE, POTASSIUM CHLORIDE, SODIUM LACTATE AND CALCIUM CHLORIDE 125 ML/HR: 600; 310; 30; 20 INJECTION, SOLUTION INTRAVENOUS at 18:58

## 2024-01-21 RX ADMIN — ACETAMINOPHEN 975 MG: 325 TABLET ORAL at 22:30

## 2024-01-21 RX ADMIN — Medication 2 MILLI-UNITS/MIN: at 19:52

## 2024-01-21 SDOH — SOCIAL STABILITY: SOCIAL INSECURITY: DO YOU FEEL ANYONE HAS EXPLOITED OR TAKEN ADVANTAGE OF YOU FINANCIALLY OR OF YOUR PERSONAL PROPERTY?: NO

## 2024-01-21 SDOH — SOCIAL STABILITY: SOCIAL INSECURITY: HAS ANYONE EVER THREATENED TO HURT YOUR FAMILY OR YOUR PETS?: NO

## 2024-01-21 SDOH — HEALTH STABILITY: MENTAL HEALTH: CURRENT SMOKER: 0

## 2024-01-21 SDOH — SOCIAL STABILITY: SOCIAL INSECURITY: VERBAL ABUSE: DENIES

## 2024-01-21 SDOH — SOCIAL STABILITY: SOCIAL INSECURITY: ARE THERE ANY APPARENT SIGNS OF INJURIES/BEHAVIORS THAT COULD BE RELATED TO ABUSE/NEGLECT?: NO

## 2024-01-21 SDOH — ECONOMIC STABILITY: HOUSING INSECURITY: DO YOU FEEL UNSAFE GOING BACK TO THE PLACE WHERE YOU ARE LIVING?: NO

## 2024-01-21 SDOH — HEALTH STABILITY: MENTAL HEALTH: NON-SPECIFIC ACTIVE SUICIDAL THOUGHTS (PAST 1 MONTH): NO

## 2024-01-21 SDOH — SOCIAL STABILITY: SOCIAL INSECURITY: DOES ANYONE TRY TO KEEP YOU FROM HAVING/CONTACTING OTHER FRIENDS OR DOING THINGS OUTSIDE YOUR HOME?: NO

## 2024-01-21 SDOH — SOCIAL STABILITY: SOCIAL INSECURITY: ARE YOU OR HAVE YOU BEEN THREATENED OR ABUSED PHYSICALLY, EMOTIONALLY, OR SEXUALLY BY ANYONE?: NO

## 2024-01-21 SDOH — HEALTH STABILITY: MENTAL HEALTH: WERE YOU ABLE TO COMPLETE ALL THE BEHAVIORAL HEALTH SCREENINGS?: YES

## 2024-01-21 SDOH — SOCIAL STABILITY: SOCIAL INSECURITY: HAVE YOU HAD THOUGHTS OF HARMING ANYONE ELSE?: NO

## 2024-01-21 SDOH — HEALTH STABILITY: MENTAL HEALTH: HAVE YOU USED ANY SUBSTANCES (CANABIS, COCAINE, HEROIN, HALLUCINOGENS, INHALANTS, ETC.) IN THE PAST 12 MONTHS?: NO

## 2024-01-21 SDOH — HEALTH STABILITY: MENTAL HEALTH: WISH TO BE DEAD (PAST 1 MONTH): NO

## 2024-01-21 SDOH — HEALTH STABILITY: MENTAL HEALTH: SUICIDAL BEHAVIOR (LIFETIME): NO

## 2024-01-21 SDOH — SOCIAL STABILITY: SOCIAL INSECURITY: ABUSE SCREEN: ADULT

## 2024-01-21 SDOH — HEALTH STABILITY: MENTAL HEALTH: HAVE YOU USED ANY PRESCRIPTION DRUGS OTHER THAN PRESCRIBED IN THE PAST 12 MONTHS?: NO

## 2024-01-21 SDOH — SOCIAL STABILITY: SOCIAL INSECURITY: PHYSICAL ABUSE: DENIES

## 2024-01-21 ASSESSMENT — PAIN SCALES - GENERAL
PAINLEVEL_OUTOF10: 0 - NO PAIN

## 2024-01-21 ASSESSMENT — LIFESTYLE VARIABLES
SKIP TO QUESTIONS 9-10: 1
AUDIT-C TOTAL SCORE: 0
AUDIT-C TOTAL SCORE: 0
HOW MANY STANDARD DRINKS CONTAINING ALCOHOL DO YOU HAVE ON A TYPICAL DAY: PATIENT DOES NOT DRINK
HOW OFTEN DO YOU HAVE 6 OR MORE DRINKS ON ONE OCCASION: NEVER
HOW OFTEN DO YOU HAVE A DRINK CONTAINING ALCOHOL: NEVER

## 2024-01-21 ASSESSMENT — PATIENT HEALTH QUESTIONNAIRE - PHQ9
1. LITTLE INTEREST OR PLEASURE IN DOING THINGS: NOT AT ALL
2. FEELING DOWN, DEPRESSED OR HOPELESS: NOT AT ALL
SUM OF ALL RESPONSES TO PHQ9 QUESTIONS 1 & 2: 0

## 2024-01-21 ASSESSMENT — ACTIVITIES OF DAILY LIVING (ADL): LACK_OF_TRANSPORTATION: PATIENT DECLINED

## 2024-01-21 ASSESSMENT — PAIN - FUNCTIONAL ASSESSMENT: PAIN_FUNCTIONAL_ASSESSMENT: 0-10

## 2024-01-21 NOTE — ANESTHESIA PROCEDURE NOTES
Epidural Block    Patient location during procedure: OB  Start time: 1/21/2024 6:00 PM  End time: 1/21/2024 6:11 PM  Reason for block: labor analgesia  Staffing  Performed: CRNA   Authorized by: JAMES Dixon    Performed by: JAMES Dixon    Preanesthetic Checklist  Completed: patient identified, IV checked, risks and benefits discussed, surgical consent, pre-op evaluation, timeout performed and sterile techniques followed  Block Timeout  RN/Licensed healthcare professional reads aloud to the Anesthesia provider and entire team: Patient identity, procedure with side and site, patient position, and as applicable the availability of implants/special equipment/special requirements.  Patient on coagulant treatment: no  Timeout performed at: 1/21/2024 5:50 PM  Block Placement  Patient position: sitting  Prep: ChloraPrep  Sterility prep: drape, gloves and mask  Sedation level: no sedation  Patient monitoring: blood pressure, continuous pulse oximetry and heart rate  Approach: midline  Local numbing: lidocaine 1% to skin and subcutaneous tissues  Vertebral space: lumbar  Lumbar location: L4-L5  Epidural  Loss of resistance technique: saline  Guidance: landmark technique        Needle  Needle type: Tuohy   Needle gauge: 18  Catheter type: multi-orifice  Catheter size: 20 G  Catheter at skin depth: 14 cm  Catheter securement method: clear occlusive dressing    Test dose: lidocaine 1.5% with epinephrine 1-to-200,000  Test dose given at 1/21/2024 6:11 PM  Test dose: lidocaine 1.5% with epinephrine 1-to-200,000  Test dose result: no positive test dose            Assessment  Sensory level: T6 bilateral  Block outcome: patient comfortable  Number of attempts: 2 (see note)  Events: no positive test dose  Procedure assessment: patient tolerated procedure well with no immediate complications  Additional Notes  Patient was prepped and draped, given informed consent and procedural timeout was performed  at 1750- upon sitting the patient stated she thought the baby's head was coming out so the procedure was aborted and patient was laid back for a cervical exam.  Dr. Avendaño determined that the pt exam was 3-4 cm dilated and patient said she wished to sit back up and attempt the epidural again.  The above procedure note indicates the successful attempt at epidural placement.  Pt tolerated the procedure well. Pt pain was significantly improved.

## 2024-01-21 NOTE — CARE PLAN
Problem: Vaginal Birth or  Section  Goal: Fetal and maternal status remain reassuring during the birth process  Outcome: Progressing  Goal: Tolerate CRB for IOL placement maintenance until dislodgement/removal 12hrs after placement  Outcome: Progressing  Goal: Prevention of malpresentation/labor dystocia through delivery  Outcome: Progressing  Goal: Demonstrates labor coping techniques through delivery  Outcome: Progressing  Goal: Minimal s/sx of HDP and BP<160/110  Outcome: Progressing  Goal: No s/sx of infection through recovery  Outcome: Progressing  Goal: No s/sx of hemorrhage through recovery  Outcome: Progressing     Problem: Pain - Adult  Goal: Verbalizes/displays adequate comfort level or baseline comfort level  Outcome: Progressing     Problem: Safety - Adult  Goal: Free from fall injury  Outcome: Progressing   The patient's goals for the shift include safe delivery    The clinical goals for the shift include FHR tracing will remain reassuring throughout induction of labor      Cytotec X2, pitocin per protocol started. Plan for epidural. Pt desires to take placenta home. Plans for no erythromycin ointment for . Ok for vitamin K. Plans skin to skin and breastfeeding.

## 2024-01-21 NOTE — ANESTHESIA PREPROCEDURE EVALUATION
Patient: Luisa Burger    Evaluation Method: In-person visit    Procedure Information    Date: 01/21/24  Procedure: Labor Analgesia         Relevant Problems   Endocrine   (+) Hypothyroidism       Clinical information reviewed:   Tobacco  Allergies  Meds   Med Hx  Surg Hx   Fam Hx  Soc Hx        NPO Detail:  No data recorded     OB/Gyn Evaluation    Present Pregnancy    Patient is pregnant now.   Obstetric History                Physical Exam    Airway  Mallampati: II  TM distance: >3 FB  Neck ROM: full     Cardiovascular   Rate: normal     Dental - normal exam     Pulmonary    Abdominal            Anesthesia Plan    History of general anesthesia?: no  History of complications of general anesthesia?: no    ASA 2     epidural     The patient is not a current smoker.  Patient was not previously instructed to abstain from smoking on day of procedure.  Patient did not smoke on day of procedure.    Anesthetic plan and risks discussed with patient and spouse.    Plan discussed with CRNA.

## 2024-01-21 NOTE — H&P
Obstetrical Admission History and Physical     Luisa Burger is a 33 y.o.  at 39w0d. ROD: 2024, by Last Menstrual Period. Estimated fetal weight: 3500. She has [facility] with [facility] .for   iol hx   shoulder   dystocia last    delivery  ni   dm   prenatal   neg   aller  neg  meds    thyroid  pmh   hypothyroid    psh   femur  br   aug  smoke  etoh  drugs   neg   gbs   neg  epidural   prn      Chief Complaint: Scheduled Induction    Assessment/Plan      39 weeks   for   iol   po   cytotec       Active Problems:  There are no active Hospital Problems.      Pregnancy Problems (from 22 to present)       No problems associated with this episode.          Options for delivery have been discussed with the patient and she elects for an induction of labor.  Cervical ripening with cytotec, cervidil, other prostaglandin agents has been discussed.  Induction of labor with pitocin, amniotomy, cytotec, and cervical balloon have been discussed in detail. The risks, benefits, complications, alternatives, expected outcomes, potential problems during recuperation and recovery, and the risks of not performing the procedure were discussed with the patient. The patient stated understanding that the risks of delivery include, but are not limited to: death; reaction to medications; injury to bowel, bladder, ureters, uterus, cervix, vagina, and other pelvic and abdominal structures, infection; blood loss and possible need for transfusion; and potential need for surgery, including hysterectomy. The risks of injury to the infant during delivery were also discussed. All questions were answered. There was concurrence with the planned procedure, and the patient wanted to proceed.    Admit to inpatient status. I anticipate that this patient will require a stay exceeding at least 2 midnights for delivery and postpartum.  Induction of labor.  Management of pregnancy complications, as indicated.    Subjective   Good  fetal movement. Denies contractions.     Reason for Induction of Labor:  Pregnancy at 39 weeks or greater for induction    Po    cytotec       Obstetrical History   OB History    Para Term  AB Living   7 3 3   3 3   SAB IAB Ectopic Multiple Live Births   1       3      # Outcome Date GA Lbr Jordy/2nd Weight Sex Delivery Anes PTL Lv   7 Current            6 Term 21 40w0d  3.629 kg M Vag-Spont EPI N ALLEN      Complications: Shoulder Dystocia   5 SAB 21           4 Term 17 38w0d  3.345 kg F Vag-Spont  N ALLEN   3 Term 11/12/15 38w0d  3.005 kg M Vag-Spont EPI N ALLEN   2 AB  4w0d          1 AB  4w0d              Past Medical History  Past Medical History:   Diagnosis Date    HPV (human papilloma virus) infection     + HPV age 15    Thyroid dysfunction     hx hashimotos tx armourthyroid 60mg        Past Surgical History   Past Surgical History:   Procedure Laterality Date    LEG SURGERY Left        Social History  Social History     Tobacco Use    Smoking status: Never    Smokeless tobacco: Never   Substance Use Topics    Alcohol use: Not Currently     Substance and Sexual Activity   Drug Use Never       Allergies  Patient has no known allergies.     Medications  Medications Prior to Admission   Medication Sig Dispense Refill Last Dose    cholecalciferol (Vitamin D-3) 50 MCG (2000 UT) tablet Take 1 tablet (2,000 Units) by mouth once daily.       magnesium 200 mg tablet 0 Refill(s), Type: Maintenance       PNV no.95/ferrous fum/folic ac (PRENATAL ORAL) Take 1 capsule by mouth once daily.       thyroid, pork, (Verplanck Thyroid) 15 mg tablet Take 1 tablet (15 mg) by mouth once daily in the morning. Take before meals.       thyroid, pork, (Verplanck Thyroid) 30 mg tablet Take 1.5 tablets by mouth every night on an empty stomach 135 tablet 10     VITAMIN B COMPLEX ORAL Take 1 tablet by mouth once daily.       diphth,pertus,acell,,tetanus (BoostRIX) 2.5-8-5 Lf-mcg-Lf/0.5mL injection Inject into  the muscle. 0.5 mL 1        Objective    Last Vitals  Temp Pulse Resp BP MAP O2 Sat   37.3 °C (99.2 °F) 78 18 113/70   97 %     Physical Examination  Heart   rrr  lungs   cta    abd  soft   n/t   cx   3/50/-1/vtx  bp   nl   plan  po   cytotec      Lab Review

## 2024-01-21 NOTE — LACTATION NOTE
Lactation Consultant Note  Lactation Consultation  Reason for Consult: Initial assessment  Consultant Name: CHRISTINE MarrCLC    Maternal Information  Has mother  before?: Yes  How long did the mother previously breastfeed?: 6 months with her first two, then 8 months with her last child  Previous Maternal Breastfeeding Challenges: Other (Comment) (Had breast augmentation in 2018 but had no issues with supply or feeding her last baby which is now 3yo,  for 8m)    Maternal Assessment  Breast Assessment:  (Breast Augmentation 2018)    Infant Assessment       Feeding Assessment       LATCH TOOL       Breast Pump       Other OB Lactation Tools       Patient Follow-up  Inpatient Lactation Follow-up Needed : Yes    Other OB Lactation Documentation  Maternal Risk Factors: History of breast surgery (Breast augmentation 2018)    Recommendations/Summary  , experienced breastfeeding mother in labor. Reports breastfeeding her first 2 children for 6m with no issues then breastfeeding her last for 8m (this was after she had a breast augmentation in 2018- no issues with breastfeeding or supply). Mother reports positive breast changes with pregnancy,  will be returning to work in 3m and has several personal breast pumps for home use. Breastfeeding handouts provided, education reviewed. Reviewed importance of frequent skin to skin contact, infant stomach capacity, value of colostrum feeds and typical  feeding behaviors in the first 24 hours. Encouraged frequent skin to skin and nursing with cues and at least 8 times in the first 24 hours. Reviewed signs breastfeeding is going well. Offered ongoing assistance with breastfeeding as needed.

## 2024-01-22 PROCEDURE — 51701 INSERT BLADDER CATHETER: CPT

## 2024-01-22 PROCEDURE — 59050 FETAL MONITOR W/REPORT: CPT

## 2024-01-22 PROCEDURE — 7100000016 HC LABOR RECOVERY PER HOUR

## 2024-01-22 PROCEDURE — 2500000001 HC RX 250 WO HCPCS SELF ADMINISTERED DRUGS (ALT 637 FOR MEDICARE OP): Performed by: OBSTETRICS & GYNECOLOGY

## 2024-01-22 PROCEDURE — 7210000002 HC LABOR PER HOUR

## 2024-01-22 PROCEDURE — 1120000001 HC OB PRIVATE ROOM DAILY

## 2024-01-22 RX ORDER — THYROID 30 MG/1
45 TABLET ORAL
Status: DISCONTINUED | OUTPATIENT
Start: 2024-01-23 | End: 2024-01-23 | Stop reason: HOSPADM

## 2024-01-22 RX ADMIN — IBUPROFEN 600 MG: 600 TABLET, FILM COATED ORAL at 16:22

## 2024-01-22 RX ADMIN — ACETAMINOPHEN 975 MG: 325 TABLET ORAL at 22:11

## 2024-01-22 RX ADMIN — IBUPROFEN 600 MG: 600 TABLET, FILM COATED ORAL at 10:21

## 2024-01-22 RX ADMIN — IBUPROFEN 600 MG: 600 TABLET, FILM COATED ORAL at 22:10

## 2024-01-22 RX ADMIN — ACETAMINOPHEN 975 MG: 325 TABLET ORAL at 04:03

## 2024-01-22 RX ADMIN — IBUPROFEN 600 MG: 600 TABLET, FILM COATED ORAL at 04:03

## 2024-01-22 RX ADMIN — LEVOTHYROXINE, LIOTHYRONINE 45 MG: 19; 4.5 TABLET ORAL at 08:42

## 2024-01-22 RX ADMIN — ACETAMINOPHEN 975 MG: 325 TABLET ORAL at 10:21

## 2024-01-22 RX ADMIN — ACETAMINOPHEN 975 MG: 325 TABLET ORAL at 16:22

## 2024-01-22 ASSESSMENT — PAIN SCALES - GENERAL
PAINLEVEL_OUTOF10: 2
PAINLEVEL_OUTOF10: 0 - NO PAIN
PAINLEVEL_OUTOF10: 2
PAIN_LEVEL: 0
PAINLEVEL_OUTOF10: 0 - NO PAIN
PAINLEVEL_OUTOF10: 4
PAINLEVEL_OUTOF10: 3

## 2024-01-22 ASSESSMENT — PAIN DESCRIPTION - LOCATION
LOCATION: ABDOMEN
LOCATION: BACK
LOCATION: BACK

## 2024-01-22 ASSESSMENT — PAIN - FUNCTIONAL ASSESSMENT
PAIN_FUNCTIONAL_ASSESSMENT: 0-10

## 2024-01-22 ASSESSMENT — PAIN DESCRIPTION - ORIENTATION
ORIENTATION: LOWER
ORIENTATION: LOWER

## 2024-01-22 NOTE — SIGNIFICANT EVENT
Dr echols aware of mild range pressure. Pt very uncomfortable awaiting epidural placement.   
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 You will be transferred to [facility] for further management.

## 2024-01-22 NOTE — L&D DELIVERY NOTE
OB Delivery Note  2024  Luisa Burger  33 y.o.   Vaginal, Spontaneous        Gestational Age: 39w0d  /Para:   Quantitative Blood Loss: Admission to Discharge: 0 mL (2024  8:20 AM - 2024  9:48 PM)    Asael Burger [[id number]]      Labor Events    Rupture date/time: 2024 1715  Rupture type: Spontaneous  Fluid color: Clear  Fluid odor: None  Complications: None       Placenta    Placenta delivery date/time:   Placenta removal:        Lacerations    Episiotomy: None        Delivery    Birth date/time: 2025 21:32:00  Delivery type: Vaginal, Spontaneous  Complications: None       Apgars    Living status:   Apgar Component Scores:  1 min.:  5 min.:  10 min.:  15 min.:  20 min.:    Skin color:         Heart rate:         Reflex irritability:         Muscle tone:         Respiratory effort:         Total:                Delivery Providers    Delivering clinician: Efraín Mchugh MD   Provider Role     Delivery Nurse     Nursery Nurse     Resident                 Efraín Mchugh MD

## 2024-01-22 NOTE — NURSING NOTE
Full sensation returned, patient up to bathroom with stand by assist, able to void, denies dizziness or lightheadedness.  Patient up to shower. Clothing and linens changed.

## 2024-01-22 NOTE — LACTATION NOTE
This note was copied from a baby's chart.  Lactation Consultant Note  Lactation Consultation  Reason for Consult: Initial assessment  Consultant Name: ANU Dhillon    Maternal Information  Has mother  before?: Yes  How long did the mother previously breastfeed?: 6 months, 6 months, and 8 months    Maternal Assessment       Infant Assessment  Infant Behavior: Deep sleep    Feeding Assessment  Nutrition Source: Breastmilk  Feeding Method: Nursing at the breast    LATCH TOOL       Breast Pump       Other OB Lactation Tools       Patient Follow-up       Other OB Lactation Documentation  Maternal Risk Factors: History of breast surgery    Recommendations/Summary   experienced breastfeeding mother and infant. Mother states that she has breast fed all of her children. Mother reports that she has had breast augmentation, but has nursed her most recent child since then without any milk supply issues. Mother states that her supply was actually best with that infant. Mother endorses positive breast changes with this pregnancy.   Infant is eating well per mother and is seemingly satisfied after feedings. Reviewed milk supply coming in around day 3 of life. Reviewed what a deep and comfortable latch should look and feel like. Mother states that infants latch is good and she denies any pain or discomfort. Infant eating regularly at this time. Mother confident with breastfeeding and denies needing any assistance from lactation at this time. Mother to call with any questions or concerns.

## 2024-01-22 NOTE — ANESTHESIA POSTPROCEDURE EVALUATION
Patient: Luisa Burger    Procedure Summary       Date: 01/21/24 Room / Location:     Anesthesia Start: 1750 Anesthesia Stop: 2132    Procedure: Labor Analgesia Diagnosis:     Scheduled Providers:  Responsible Provider: JAMES Dixon    Anesthesia Type: epidural ASA Status: 2            Anesthesia Type: epidural    Visit Vitals  /61   Pulse 66   Temp 36.7 °C (98.1 °F) (Temporal)   Resp 16        Anesthesia Post Evaluation    Patient location during evaluation: bedside  Patient participation: complete - patient participated  Level of consciousness: awake and alert  Pain score: 0  Pain management: satisfactory to patient  Multimodal analgesia pain management approach  Airway patency: patent  Two or more strategies used to mitigate risk of obstructive sleep apnea  Cardiovascular status: acceptable  Respiratory status: acceptable  Hydration status: acceptable  Postoperative Nausea and Vomiting: none        No notable events documented.

## 2024-01-22 NOTE — CARE PLAN
The patient's goals for the shift include safe delivery    The clinical goals for the shift include FHR tracing will remain reassuring throughout induction of labor    Over the shift, the patient did not make progress toward the following goals. Barriers to progression include none. Recommendations to address these barriers include none.

## 2024-01-23 ENCOUNTER — LACTATION ENCOUNTER (OUTPATIENT)
Age: 34
End: 2024-01-23

## 2024-01-23 VITALS
TEMPERATURE: 98.2 F | WEIGHT: 172.4 LBS | HEIGHT: 61 IN | SYSTOLIC BLOOD PRESSURE: 121 MMHG | BODY MASS INDEX: 32.55 KG/M2 | HEART RATE: 59 BPM | OXYGEN SATURATION: 97 % | DIASTOLIC BLOOD PRESSURE: 68 MMHG | RESPIRATION RATE: 18 BRPM

## 2024-01-23 PROBLEM — N91.2 AMENORRHEA: Status: RESOLVED | Noted: 2023-10-06 | Resolved: 2024-01-23

## 2024-01-23 PROBLEM — N93.0 POSTCOITAL BLEEDING: Status: RESOLVED | Noted: 2023-10-06 | Resolved: 2024-01-23

## 2024-01-23 PROBLEM — R35.0 URINARY FREQUENCY: Status: RESOLVED | Noted: 2023-10-06 | Resolved: 2024-01-23

## 2024-01-23 PROCEDURE — 99239 HOSP IP/OBS DSCHRG MGMT >30: CPT | Performed by: OBSTETRICS & GYNECOLOGY

## 2024-01-23 PROCEDURE — 2500000001 HC RX 250 WO HCPCS SELF ADMINISTERED DRUGS (ALT 637 FOR MEDICARE OP): Performed by: OBSTETRICS & GYNECOLOGY

## 2024-01-23 RX ORDER — ACETAMINOPHEN 325 MG/1
975 TABLET ORAL EVERY 6 HOURS
Qty: 100 TABLET | Refills: 0
Start: 2024-01-23

## 2024-01-23 RX ORDER — IBUPROFEN 600 MG/1
600 TABLET ORAL EVERY 6 HOURS
Qty: 100 TABLET | Refills: 0
Start: 2024-01-23 | End: 2024-03-12 | Stop reason: ALTCHOICE

## 2024-01-23 RX ADMIN — ACETAMINOPHEN 975 MG: 325 TABLET ORAL at 05:11

## 2024-01-23 RX ADMIN — IBUPROFEN 600 MG: 600 TABLET, FILM COATED ORAL at 05:12

## 2024-01-23 ASSESSMENT — PAIN - FUNCTIONAL ASSESSMENT: PAIN_FUNCTIONAL_ASSESSMENT: 0-10

## 2024-01-23 NOTE — DISCHARGE SUMMARY
Discharge Summary    Admission Date: 2024  Discharge Date: 24    Discharge Diagnosis  Normal labor and delivery    Hospital Course  Delivery Date: 2024  9:32 PM   Delivery type: Vaginal, Spontaneous    GA at delivery: 39w0d  Outcome: Living   Anesthesia during delivery: Epidural   Intrapartum complications: None   Feeding method: Breastfeeding Status: Yes     Procedures:    Contraception at discharge: will be discussed at postpartum visit      Pertinent Physical Exam At Time of Discharge    General: Examination reveals a well developed, well nourished, female, in no acute distress. She is alert and cooperative.  Lungs: appropriate effort.  Fundus: firm, below umbilicus, and nontender.  Psychological: awake and alert; oriented to person, place, and time.    Discharge Meds     Your medication list        START taking these medications        Instructions Last Dose Given Next Dose Due   acetaminophen 325 mg tablet  Commonly known as: Tylenol      Take 3 tablets (975 mg) by mouth every 6 hours.       ibuprofen 600 mg tablet      Take 1 tablet (600 mg) by mouth every 6 hours.              CONTINUE taking these medications        Instructions Last Dose Given Next Dose Due   [facility] Thyroid 15 mg tablet  Generic drug: thyroid (pork)           [facility] Thyroid 30 mg tablet  Generic drug: thyroid (pork)      Take 1.5 tablets by mouth every night on an empty stomach       cholecalciferol 50 MCG (2000 UT) tablet  Commonly known as: Vitamin D-3           magnesium 200 mg tablet           PRENATAL ORAL           VITAMIN B COMPLEX ORAL                  STOP taking these medications      Boostrix Tdap 2.5-8-5 Lf-mcg-Lf/0.5mL injection  Generic drug: diphth,pertus(acell),tetanus                  Where to Get Your Medications        Information about where to get these medications is not yet available    Ask your nurse or doctor about these medications  acetaminophen 325 mg tablet  ibuprofen 600 mg tablet           Complications Requiring Follow-Up  None/routine postpartum care    Test Results Pending At Discharge  Pending Labs       No current pending labs.            Outpatient Follow-Up  RTO 6 weeks    I spent 35 minutes in the professional and overall care of this patient.      Kiera Thacker, DO

## 2024-01-23 NOTE — PROGRESS NOTES
Postpartum Progress Note    Assessment/Plan   Luisa Burger is a 33 y.o., , who delivered at 39w0d gestation and is now postpartum day 2.    Continue routine postpartum care  Ibuprofen/Tylenol for pain management  Regular diet  Encouraged ambulation  SCD/Lovenox for DVT prophylaxis    Discharge home with precautions and RTO 6 weeks    Principal Problem:    Normal labor and delivery    Pregnancy Problems (from 23 to present)       No problems associated with this episode.          Hospital course: no complications      Subjective   Her pain is well controlled with current medications  She is passing flatus  She is ambulating well  She is tolerating a Adult diet Regular  She reports no breast or nursing problems  She denies emotional concerns today   Her plan for contraception will be discussed at postpartum visit         Objective   Allergies:   Patient has no known allergies.         Last Vitals:  Temp Pulse Resp BP MAP Pulse Ox   36.4 °C (97.5 °F) 59 18 121/68   97 %     Vitals Min/Max Last 24 Hours:  Temp  Min: 36.4 °C (97.5 °F)  Max: 36.9 °C (98.4 °F)  Pulse  Min: 56  Max: 67  Resp  Min: 15  Max: 18  BP  Min: 110/57  Max: 121/68    Intake/Output:   No intake or output data in the 24 hours ending 24 0848    Physical Exam:  General: Examination reveals a well developed, well nourished, female, in no acute distress. She is alert and cooperative.  Lungs: appropriate effort.  Fundus: firm, below umbilicus, and nontender.  Psychological: awake and alert; oriented to person, place, and time.    Lab Data:  Lab Results   Component Value Date    WBC 9.4 2024    HGB 14.5 2024    HCT 42.6 2024     2024   Kiera Thacker DO

## 2024-01-23 NOTE — CARE PLAN
The patient's goals for the shift include getting adequate rest and pain control.     The clinical goals for the shift include Normal postpartum course with appropriate bonding with infant, as well as stable vitals, lochia and pain control.

## 2024-01-23 NOTE — CARE PLAN
The patient's goals for the shift include safe delivery    The clinical goals for the shift include Normal postpartum course with appropriate bonding with infant, as well as stable vitals, lochia and pain control.    Pt VSS and lochia WNL. PPWS and discharge reviewed with patient.

## 2024-01-23 NOTE — LACTATION NOTE
"This note was copied from a baby's chart.  Lactation Consultant Note  Lactation Consultation  Reason for Consult: Follow-up assessment  Consultant Name: Nacho Whaley    Maternal Information  Has mother  before?: Yes  How long did the mother previously breastfeed?: up to 8 months, all 3 of her children at home  Previous Maternal Breastfeeding Challenges: None  Exclusive Pump and Bottle Feed: No  WIC Program: No    Maternal Assessment  Breast Assessment: Medium, Symmetrical, Compressible  Nipple Assessment: Sore, Bleeding/cracked, Scabbed (mother reports right nipple has a \"piece of skin hanging\", reviewed and assisted with deep latch techniques on left breast, reviewed nipple care, encouraged close monitoring for healing and signs/symptoms of infection)  Alterations in Nipple Condition: Stage II - abrasions, shallow crack/fissure, stripe  [place] Assessment: Normal    Infant Assessment  Infant Behavior:  (intermittently sleepy, nurses well with stimulation)  Infant Assessment:  (full term infant, approximately 37 hours old, weight loss 3.2%, above adequate output per mother)    Feeding Assessment  Nutrition Source: Breastmilk  Feeding Method: Nursing at the breast  Suck/Feeding: Sustained, Coordinated suck/swallow/breathe, Tactile stimulation needed (active suck, visible swallowing)  Latch Assessment: Minimal assistance is needed, Instructed on deep latch, Sucking and swallowing, Sucks with long jaw movement, Lower lip turned in, Pain during feeding    LATCH TOOL  Latch: Grasps breast, tongue down, lips flanged, rhythmic sucking  Audible Swallowing: Spontaneous and intermittent (24 hours old)  Type of Nipple: Everted (After stimulation)  Comfort (Breast/Nipple): Filling, red/small blisters/bruises, mild/moderate discomfort  Hold (Positioning): Minimal assist, teach one side, mother does other, staff holds  LATCH Score: 8    Breast Pump  Pump:  (has personal breast pump for home use, will return to work after " "3 months, works 2 days a week from home and then the remaining days in office)    Other OB Lactation Tools  Lactation Tools: Lanolin    Patient Follow-up  Inpatient Lactation Follow-up Needed : No  Outpatient Lactation Follow-up:  (recommended if right nipple does not appear to be healing or if mother needs/desires outpatient assistance)  Lactation Professional - OK to Discharge: Yes    Other OB Lactation Documentation  Maternal Risk Factors: History of breast surgery (break down in right nipple)  Infant Risk Factors:  (intermittent shallow latching and pain with feedings)    Recommendations/Summary  33 year old  experienced breastfeeding mother and infant preparing for discharge. Met with parents for lactation consult to assess breastfeeding progress, to address any questions and/or concerns and to offer lactation assistance, support and education as needed and desired prior to discharge. Mother reports feedings are improving. Reports hx of a crack in right nipple resulting in \"skin hanging\". Observed mother positioning infant on Boppy well and in alignment, however, not holding infant in close to breast to allow for deepest latch and optimal comfort and milk removal.  Reviewed and assisted with deep latch reminders and techniques. Deeper, more comfortable latch achieved. Lower lip adjusted as needed. Mother verbalizes increased comfort and confidence prior to discharge.     Breastfeeding written and verbal discharge education reviewed throughout consult. Parents provided with the opportunity to ask questions. Mother asking questions regarding breastfeeding and pumping options now that she has 4 children and her next oldest is 2 years old. Provided support, education and encouragement. All questions answered. See education flow sheet for detailed list of education topics covered. Reviewed importance of frequent skin to skin contact, waking techniques, infant stomach capacity, value of breast milk feeds as well " as typical  feeding patterns and behaviors in the first few weeks of life. Encouraged frequent skin to skin and nursing with cues and at least 8-12 times or more per 24 hours. Reviewed signs of adequate intake/output. Reviewed resources for lactation follow-up and support after discharge. Parents deny any further questions or concerns at this time. Verbalize confidence with breastfeeding prior to discharge. Offered ongoing breastfeeding assistance, support and education as needed and desired.

## 2024-01-23 NOTE — DISCHARGE INSTRUCTIONS
"After Your Delivery Discharge Instructions    After Discharge Orders:  Make an appointment for postpartum follow up in the office in 6 weeks    Medical equipment: Breast pump       After your delivery - signs and symptoms to watch for:  Fever - Oral temperature greater than 100.4 degrees Fahrenheit  Foul-smelling vaginal discharge  Headache unrelieved by \"pain meds\"  Difficulty urinating  Breasts reddened, hard, hot to the touch  Nipple discharge which is foul-smelling or contains pus  Increased pain at the site of the laceration  Difficulty breathing with or without chest pain  New calf pain especially if only on one side  Sudden, continuing increased vaginal bleeding with or without clots  Unrelieved feelings of:  Inability to cope  Sadness  Anxiety  Lack of interest in baby  Insomnia  Crying     What to do at home:  See patient education handouts for full information  Resume activity gradually   Don't lift anything heavier than baby and carrier until OK'd by your Physician or Midwife  No sex until OK'd by your Physician or Midwife  Take care of yourself by sleeping/resting as much as possible  Eat regular nutritious meals  Let someone else care for you, your baby, and housework as much as possible   Take pain medication as prescribed whenever you need them  Wear compression stockings if prescribed   To avoid/relieve constipation take stool softeners if advised   Drink lots of water/fruit juices  Increase fiber in your diet  Breast care: Wear support bra ; use lanolin ointment/cream, nipple shields, or cool compresses as needed     Refer to [place] Discharge Instructions for problems or follow-up regarding  nursing  "

## 2024-03-12 ENCOUNTER — POSTPARTUM VISIT (OUTPATIENT)
Dept: OBSTETRICS AND GYNECOLOGY | Facility: CLINIC | Age: 34
End: 2024-03-12
Payer: COMMERCIAL

## 2024-03-12 VITALS
HEIGHT: 61 IN | WEIGHT: 162 LBS | SYSTOLIC BLOOD PRESSURE: 116 MMHG | DIASTOLIC BLOOD PRESSURE: 64 MMHG | BODY MASS INDEX: 30.58 KG/M2

## 2024-03-12 PROCEDURE — 0503F POSTPARTUM CARE VISIT: CPT | Performed by: OBSTETRICS & GYNECOLOGY

## 2024-03-12 NOTE — PROGRESS NOTES
Postpartum Progress Note    Assessment/Plan   Luisa Burger is a 33 y.o., , who delivered at 39w0d gestation and is now postpartum day 51.    No issues    Active Problems:  There are no active Hospital Problems.    Pregnancy Problems (from 23 to present)       No problems associated with this episode.          Hospital course: no complications       Subjective   Her pain is well controlled with current medications  She is passing flatus  She is ambulating well  She is tolerating a No diet orders on file  She reports no breast or nursing problems  She denies emotional concerns today   Her plan for contraception is vasectomy         Objective   Allergies:   Patient has no known allergies.         Last Vitals:  Temp Pulse Resp BP MAP Pulse Ox         116/64         Vitals Min/Max Last 24 Hours:  @FLOWSTAT(6,8,9,5,[id number]:24::1)@    Intake/Output:   [unfilled]    Physical Exam:  General: Examination reveals a well developed, well nourished, female, in no acute distress. She is alert and cooperative.    Lab Data:  Lab Results   Component Value Date    WBC 9.4 2024    HGB 14.5 2024    HCT 42.6 2024     2024

## 2024-04-30 PROCEDURE — RXMED WILLOW AMBULATORY MEDICATION CHARGE

## 2024-05-06 ENCOUNTER — PHARMACY VISIT (OUTPATIENT)
Dept: PHARMACY | Facility: CLINIC | Age: 34
End: 2024-05-06
Payer: COMMERCIAL

## 2024-06-11 ENCOUNTER — PHARMACY VISIT (OUTPATIENT)
Dept: PHARMACY | Facility: CLINIC | Age: 34
End: 2024-06-11
Payer: COMMERCIAL

## 2024-06-11 PROCEDURE — RXMED WILLOW AMBULATORY MEDICATION CHARGE

## 2024-06-11 RX ORDER — CEPHALEXIN 500 MG/1
500 CAPSULE ORAL EVERY 8 HOURS
Qty: 30 CAPSULE | Refills: 0 | OUTPATIENT
Start: 2024-06-11 | End: 2024-06-21

## 2024-06-11 RX ORDER — METHYLPREDNISOLONE 4 MG/1
TABLET ORAL
Qty: 21 TABLET | Refills: 0 | OUTPATIENT
Start: 2024-06-11

## 2024-06-11 RX ORDER — CYCLOBENZAPRINE HCL 10 MG
TABLET ORAL
Qty: 20 TABLET | Refills: 0 | OUTPATIENT
Start: 2024-06-11

## 2024-06-13 DIAGNOSIS — Z00.00 HEALTHCARE MAINTENANCE: Primary | ICD-10-CM

## 2024-06-13 DIAGNOSIS — R63.5 WEIGHT GAIN: ICD-10-CM

## 2024-06-13 DIAGNOSIS — E06.3 HASHIMOTO'S THYROIDITIS: ICD-10-CM

## 2024-06-13 NOTE — PROGRESS NOTES
Luisa reports difficulty with weight loss after lifestyle modifications.  Recommend to check updated labs re: metabolic and thyroid function in patient with Hashimoto's thyroiditis.

## 2024-07-24 ENCOUNTER — APPOINTMENT (OUTPATIENT)
Dept: OBSTETRICS AND GYNECOLOGY | Facility: CLINIC | Age: 34
End: 2024-07-24
Payer: COMMERCIAL

## 2024-08-22 ENCOUNTER — LAB (OUTPATIENT)
Dept: LAB | Facility: LAB | Age: 34
End: 2024-08-22
Payer: COMMERCIAL

## 2024-08-22 ENCOUNTER — PHARMACY VISIT (OUTPATIENT)
Dept: PHARMACY | Facility: CLINIC | Age: 34
End: 2024-08-22
Payer: COMMERCIAL

## 2024-08-22 DIAGNOSIS — R63.5 WEIGHT GAIN: ICD-10-CM

## 2024-08-22 DIAGNOSIS — E06.3 HASHIMOTO'S THYROIDITIS: ICD-10-CM

## 2024-08-22 DIAGNOSIS — Z00.00 HEALTHCARE MAINTENANCE: ICD-10-CM

## 2024-08-22 LAB
25(OH)D3 SERPL-MCNC: 41 NG/ML (ref 30–100)
ALBUMIN SERPL BCP-MCNC: 4.6 G/DL (ref 3.4–5)
ALP SERPL-CCNC: 75 U/L (ref 33–110)
ALT SERPL W P-5'-P-CCNC: 14 U/L (ref 7–45)
ANION GAP SERPL CALC-SCNC: 12 MMOL/L (ref 10–20)
AST SERPL W P-5'-P-CCNC: 16 U/L (ref 9–39)
BASOPHILS # BLD AUTO: 0.05 X10*3/UL (ref 0–0.1)
BASOPHILS NFR BLD AUTO: 0.8 %
BILIRUB SERPL-MCNC: 0.6 MG/DL (ref 0–1.2)
BUN SERPL-MCNC: 16 MG/DL (ref 6–23)
CALCIUM SERPL-MCNC: 9.7 MG/DL (ref 8.6–10.6)
CHLORIDE SERPL-SCNC: 102 MMOL/L (ref 98–107)
CHOLEST SERPL-MCNC: 188 MG/DL (ref 0–199)
CHOLESTEROL/HDL RATIO: 2.8
CO2 SERPL-SCNC: 29 MMOL/L (ref 21–32)
CREAT SERPL-MCNC: 0.84 MG/DL (ref 0.5–1.05)
EGFRCR SERPLBLD CKD-EPI 2021: >90 ML/MIN/1.73M*2
EOSINOPHIL # BLD AUTO: 0.11 X10*3/UL (ref 0–0.7)
EOSINOPHIL NFR BLD AUTO: 1.8 %
ERYTHROCYTE [DISTWIDTH] IN BLOOD BY AUTOMATED COUNT: 11.9 % (ref 11.5–14.5)
GLUCOSE SERPL-MCNC: 88 MG/DL (ref 74–99)
HCT VFR BLD AUTO: 44.2 % (ref 36–46)
HDLC SERPL-MCNC: 66.2 MG/DL
HGB BLD-MCNC: 14.9 G/DL (ref 12–16)
IMM GRANULOCYTES # BLD AUTO: 0.02 X10*3/UL (ref 0–0.7)
IMM GRANULOCYTES NFR BLD AUTO: 0.3 % (ref 0–0.9)
INSULIN P FAST SERPL-ACNC: 12 UIU/ML (ref 3–25)
LDLC SERPL CALC-MCNC: 87 MG/DL
LYMPHOCYTES # BLD AUTO: 1.77 X10*3/UL (ref 1.2–4.8)
LYMPHOCYTES NFR BLD AUTO: 28.2 %
MCH RBC QN AUTO: 30 PG (ref 26–34)
MCHC RBC AUTO-ENTMCNC: 33.7 G/DL (ref 32–36)
MCV RBC AUTO: 89 FL (ref 80–100)
MONOCYTES # BLD AUTO: 0.44 X10*3/UL (ref 0.1–1)
MONOCYTES NFR BLD AUTO: 7 %
NEUTROPHILS # BLD AUTO: 3.89 X10*3/UL (ref 1.2–7.7)
NEUTROPHILS NFR BLD AUTO: 61.9 %
NON HDL CHOLESTEROL: 122 MG/DL (ref 0–149)
NRBC BLD-RTO: 0 /100 WBCS (ref 0–0)
PLATELET # BLD AUTO: 335 X10*3/UL (ref 150–450)
POTASSIUM SERPL-SCNC: 4.5 MMOL/L (ref 3.5–5.3)
PROT SERPL-MCNC: 7.7 G/DL (ref 6.4–8.2)
RBC # BLD AUTO: 4.97 X10*6/UL (ref 4–5.2)
SODIUM SERPL-SCNC: 138 MMOL/L (ref 136–145)
T3FREE SERPL-MCNC: 2.8 PG/ML (ref 2.3–4.2)
T4 FREE SERPL-MCNC: 0.93 NG/DL (ref 0.78–1.48)
THYROPEROXIDASE AB SERPL-ACNC: >1000 IU/ML
TRIGL SERPL-MCNC: 176 MG/DL (ref 0–149)
TSH SERPL-ACNC: 13.36 MIU/L (ref 0.44–3.98)
VIT B12 SERPL-MCNC: 756 PG/ML (ref 211–911)
VLDL: 35 MG/DL (ref 0–40)
WBC # BLD AUTO: 6.3 X10*3/UL (ref 4.4–11.3)

## 2024-08-22 PROCEDURE — 84443 ASSAY THYROID STIM HORMONE: CPT

## 2024-08-22 PROCEDURE — 84481 FREE ASSAY (FT-3): CPT

## 2024-08-22 PROCEDURE — 36415 COLL VENOUS BLD VENIPUNCTURE: CPT

## 2024-08-22 PROCEDURE — RXMED WILLOW AMBULATORY MEDICATION CHARGE

## 2024-08-22 PROCEDURE — 85025 COMPLETE CBC W/AUTO DIFF WBC: CPT

## 2024-08-22 PROCEDURE — 83525 ASSAY OF INSULIN: CPT

## 2024-08-22 PROCEDURE — 80061 LIPID PANEL: CPT

## 2024-08-22 PROCEDURE — 86376 MICROSOMAL ANTIBODY EACH: CPT

## 2024-08-22 PROCEDURE — 84439 ASSAY OF FREE THYROXINE: CPT

## 2024-08-22 PROCEDURE — 82607 VITAMIN B-12: CPT

## 2024-08-22 PROCEDURE — 82306 VITAMIN D 25 HYDROXY: CPT

## 2024-08-22 PROCEDURE — 80053 COMPREHEN METABOLIC PANEL: CPT

## 2024-08-22 RX ORDER — LIOTHYRONINE SODIUM 25 UG/1
25 TABLET ORAL DAILY
Qty: 30 TABLET | Refills: 2 | OUTPATIENT
Start: 2024-08-22

## 2024-08-22 RX ORDER — LEVOTHYROXINE SODIUM 88 UG/1
88 TABLET ORAL EVERY MORNING
Qty: 30 TABLET | Refills: 2 | OUTPATIENT
Start: 2024-08-22

## 2024-09-26 PROCEDURE — RXMED WILLOW AMBULATORY MEDICATION CHARGE

## 2024-10-05 ENCOUNTER — PHARMACY VISIT (OUTPATIENT)
Dept: PHARMACY | Facility: CLINIC | Age: 34
End: 2024-10-05
Payer: COMMERCIAL

## 2024-10-05 PROCEDURE — RXMED WILLOW AMBULATORY MEDICATION CHARGE

## 2024-10-07 ENCOUNTER — APPOINTMENT (OUTPATIENT)
Dept: PRIMARY CARE | Facility: CLINIC | Age: 34
End: 2024-10-07
Payer: COMMERCIAL

## 2024-10-07 VITALS
OXYGEN SATURATION: 96 % | HEIGHT: 61 IN | DIASTOLIC BLOOD PRESSURE: 69 MMHG | BODY MASS INDEX: 29.28 KG/M2 | HEART RATE: 74 BPM | SYSTOLIC BLOOD PRESSURE: 107 MMHG

## 2024-10-07 DIAGNOSIS — E03.9 HYPOTHYROIDISM, UNSPECIFIED TYPE: ICD-10-CM

## 2024-10-07 DIAGNOSIS — Z00.00 WELL ADULT EXAM: Primary | ICD-10-CM

## 2024-10-07 PROCEDURE — 1036F TOBACCO NON-USER: CPT

## 2024-10-07 PROCEDURE — 99385 PREV VISIT NEW AGE 18-39: CPT

## 2024-10-07 RX ORDER — LIOTHYRONINE SODIUM 25 UG/1
25 TABLET ORAL DAILY
Qty: 30 TABLET | Refills: 2 | Status: SHIPPED | OUTPATIENT
Start: 2024-10-07

## 2024-10-07 RX ORDER — LEVOTHYROXINE SODIUM 88 UG/1
88 TABLET ORAL DAILY
COMMUNITY

## 2024-10-07 RX ORDER — LEVOTHYROXINE SODIUM 88 UG/1
88 TABLET ORAL EVERY MORNING
Qty: 30 TABLET | Refills: 2 | Status: SHIPPED | OUTPATIENT
Start: 2024-10-07

## 2024-10-07 ASSESSMENT — PATIENT HEALTH QUESTIONNAIRE - PHQ9
2. FEELING DOWN, DEPRESSED OR HOPELESS: NOT AT ALL
SUM OF ALL RESPONSES TO PHQ9 QUESTIONS 1 AND 2: 0
1. LITTLE INTEREST OR PLEASURE IN DOING THINGS: NOT AT ALL

## 2024-10-07 NOTE — ASSESSMENT & PLAN NOTE
Orders:    liothyronine (Cytomel) 25 mcg tablet; Take 1 tablet (25 mcg) by mouth once daily in the afternoon    levothyroxine (Synthroid, Levoxyl) 88 mcg tablet; Take 1 tablet (88 mcg) by mouth once daily in the morning.    Tsh With Reflex To Free T4 If Abnormal; Future    T3, free; Future    Vitamin D 25-Hydroxy,Total (for eval of Vitamin D levels); Future    Vitamin B12; Future    Ferritin; Future

## 2024-10-07 NOTE — PROGRESS NOTES
Subjective   Reason for Visit: Luisa Burger is an 33 y.o. female here for a CPE     Chief Complaint   Patient presents with    New Patient Visit    Annual Exam     Refill on thyroid medication        HPI  Luisa  Pt in today for physical. Overall no issues or concerns. She had a baby a couple months ago. She has a hx of hasimotos. She needs refills on her thyroid medications and needs up to date labs. Denies vision changes, chest pain, heart palpitations, dizziness, nausea, vomiting, diarrhea, constipation, or urinary changes.       Visit date not found    Current Providers  Specialists: I have reviewed specialist-related care of the patient in the medical record.     #CPE   Occupation:  Nurse Practitioner    Do you take any herbs or supplements that were not prescribed by a doctor? Vitamin d, thyroid, vitamin B12,   Are you taking calcium supplements? NO  Are you taking aspirin daily? no    Having a colonoscopy in Nov after sister had a poylp.     LMP: sept 12  PAP: 2 years  Mammogram:    GYN:   Sexually active: yes  # Partners:1  STI screening:     Fasting blood work:     Last eye exam: 2023  Last dental Exam: 2024  Exercise: Does peloton,   Mood: No issues or concerns  Sleep: This week feels that sleep is better  Vaccines:       Health Maintenance Due   Topic Date Due    Yearly Adult Physical  Never done    HPV Vaccines (3 - 3-dose series) 08/07/2008    Influenza Vaccine (1) 09/01/2024    COVID-19 Vaccine (2 - 2023-24 season) 09/01/2024       No Known Allergies            Lab on 08/22/2024   Component Date Value Ref Range Status    Glucose 08/22/2024 88  74 - 99 mg/dL Final    Sodium 08/22/2024 138  136 - 145 mmol/L Final    Potassium 08/22/2024 4.5  3.5 - 5.3 mmol/L Final    Chloride 08/22/2024 102  98 - 107 mmol/L Final    Bicarbonate 08/22/2024 29  21 - 32 mmol/L Final    Anion Gap 08/22/2024 12  10 - 20 mmol/L Final    Urea Nitrogen 08/22/2024 16  6 - 23 mg/dL Final    Creatinine 08/22/2024 0.84  0.50 -  1.05 mg/dL Final    eGFR 08/22/2024 >90  >60 mL/min/1.73m*2 Final    Calculations of estimated GFR are performed using the 2021 CKD-EPI Study Refit equation without the race variable for the IDMS-Traceable creatinine methods.  https://jasn.asnjournals.org/content/early/2021/09/22/ASN.6920381964    Calcium 08/22/2024 9.7  8.6 - 10.6 mg/dL Final    Albumin 08/22/2024 4.6  3.4 - 5.0 g/dL Final    Alkaline Phosphatase 08/22/2024 75  33 - 110 U/L Final    Total Protein 08/22/2024 7.7  6.4 - 8.2 g/dL Final    AST 08/22/2024 16  9 - 39 U/L Final    Bilirubin, Total 08/22/2024 0.6  0.0 - 1.2 mg/dL Final    ALT 08/22/2024 14  7 - 45 U/L Final    Patients treated with Sulfasalazine may generate falsely decreased results for ALT.    WBC 08/22/2024 6.3  4.4 - 11.3 x10*3/uL Final    nRBC 08/22/2024 0.0  0.0 - 0.0 /100 WBCs Final    RBC 08/22/2024 4.97  4.00 - 5.20 x10*6/uL Final    Hemoglobin 08/22/2024 14.9  12.0 - 16.0 g/dL Final    Hematocrit 08/22/2024 44.2  36.0 - 46.0 % Final    MCV 08/22/2024 89  80 - 100 fL Final    MCH 08/22/2024 30.0  26.0 - 34.0 pg Final    MCHC 08/22/2024 33.7  32.0 - 36.0 g/dL Final    RDW 08/22/2024 11.9  11.5 - 14.5 % Final    Platelets 08/22/2024 335  150 - 450 x10*3/uL Final    Neutrophils % 08/22/2024 61.9  40.0 - 80.0 % Final    Immature Granulocytes %, Automated 08/22/2024 0.3  0.0 - 0.9 % Final    Immature Granulocyte Count (IG) includes promyelocytes, myelocytes and metamyelocytes but does not include bands. Percent differential counts (%) should be interpreted in the context of the absolute cell counts (cells/UL).    Lymphocytes % 08/22/2024 28.2  13.0 - 44.0 % Final    Monocytes % 08/22/2024 7.0  2.0 - 10.0 % Final    Eosinophils % 08/22/2024 1.8  0.0 - 6.0 % Final    Basophils % 08/22/2024 0.8  0.0 - 2.0 % Final    Neutrophils Absolute 08/22/2024 3.89  1.20 - 7.70 x10*3/uL Final    Percent differential counts (%) should be interpreted in the context of the absolute cell counts  (cells/uL).    Immature Granulocytes Absolute, Au* 08/22/2024 0.02  0.00 - 0.70 x10*3/uL Final    Lymphocytes Absolute 08/22/2024 1.77  1.20 - 4.80 x10*3/uL Final    Monocytes Absolute 08/22/2024 0.44  0.10 - 1.00 x10*3/uL Final    Eosinophils Absolute 08/22/2024 0.11  0.00 - 0.70 x10*3/uL Final    Basophils Absolute 08/22/2024 0.05  0.00 - 0.10 x10*3/uL Final    Vitamin D, 25-Hydroxy, Total 08/22/2024 41  30 - 100 ng/mL Final    Vitamin B12 08/22/2024 756  211 - 911 pg/mL Final    Thyroid Stimulating Hormone 08/22/2024 13.36 (H)  0.44 - 3.98 mIU/L Final    Triiodothyronine, Free 08/22/2024 2.8  2.3 - 4.2 pg/mL Final    Thyroxine, Free 08/22/2024 0.93  0.78 - 1.48 ng/dL Final    Thyroid Peroxidase (TPO) Antibody 08/22/2024 >1,000 (H)  <=60 IU/mL Final    Cholesterol 08/22/2024 188  0 - 199 mg/dL Final          Age      Desirable   Borderline High   High     0-19 Y     0 - 169       170 - 199     >/= 200    20-24 Y     0 - 189       190 - 224     >/= 225         >24 Y     0 - 199       200 - 239     >/= 240   **All ranges are based on fasting samples. Specific   therapeutic targets will vary based on patient-specific   cardiac risk.    Pediatric guidelines reference:Pediatrics 2011, 128(S5).Adult guidelines reference: NCEP ATPIII Guidelines,DEMARCUS 2001, 258:2486-97    Venipuncture immediately after or during the administration of Metamizole may lead to falsely low results. Testing should be performed immediately prior to Metamizole dosing.    HDL-Cholesterol 08/22/2024 66.2  mg/dL Final      Age       Very Low   Low     Normal    High    0-19 Y    < 35      < 40     40-45     ----  20-24 Y    ----     < 40      >45      ----        >24 Y      ----     < 40     40-60      >60      Cholesterol/HDL Ratio 08/22/2024 2.8   Final      Ref Values  Desirable  < 3.4  High Risk  > 5.0    LDL Calculated 08/22/2024 87  <=99 mg/dL Final                                Near   Borderline      AGE      Desirable  Optimal    High      "High     Very High     0-19 Y     0 - 109     ---    110-129   >/= 130     ----    20-24 Y     0 - 119     ---    120-159   >/= 160     ----      >24 Y     0 -  99   100-129  130-159   160-189     >/=190      VLDL 08/22/2024 35  0 - 40 mg/dL Final    Triglycerides 08/22/2024 176 (H)  0 - 149 mg/dL Final       Age         Desirable   Borderline High   High     Very High   0 D-90 D    19 - 174         ----         ----        ----  91 D- 9 Y     0 -  74        75 -  99     >/= 100      ----    10-19 Y     0 -  89        90 - 129     >/= 130      ----    20-24 Y     0 - 114       115 - 149     >/= 150      ----         >24 Y     0 - 149       150 - 199    200- 499    >/= 500    Venipuncture immediately after or during the administration of Metamizole may lead to falsely low results. Testing should be performed immediately prior to Metamizole dosing.    Non HDL Cholesterol 08/22/2024 122  0 - 149 mg/dL Final          Age       Desirable   Borderline High   High     Very High     0-19 Y     0 - 119       120 - 144     >/= 145    >/= 160    20-24 Y     0 - 149       150 - 189     >/= 190      ----         >24 Y    30 mg/dL above LDL Cholesterol goal      Insulin, Fasting 08/22/2024 12  3 - 25 uIU/mL Final         Patient Care Team:  Efraín Mchugh MD as PCP - [employer] PCP  HENRY Yost-BERNADINE as Primary Care Provider     Review of Systems    Objective   Vitals:  /69   Pulse 74   Ht 1.549 m (5' 1\")   SpO2 96%   BMI 29.28 kg/m²       Current Outpatient Medications   Medication Instructions    acetaminophen (TYLENOL) 975 mg, oral, Every 6 hours    cholecalciferol (VITAMIN D-3) 2,000 Units, oral, Daily    cyclobenzaprine (Flexeril) 10 mg tablet Take 1 tablet by mouth once a day (bedtime) as needed.  For muscle stiffness/spasm; do not drive or operate machinery while taking this medication    levothyroxine (SYNTHROID) 88 mcg, oral, Daily, Take on an empty stomach at the same time each day, either " 30 to 60 minutes prior to breakfast    liothyronine (Cytomel) 25 mcg tablet Take 1 tablet (25 mcg) by mouth once daily in the afternoon    magnesium 200 mg tablet 0 Refill(s), Type: Maintenance    methylPREDNISolone (Medrol, Efra,) 4 mg tablets Taper the tablets over 6 days per package insert instructions.  Take with food to minimize stomach irritation.  Duration: 1 week    PNV no.95/ferrous fum/folic ac (PRENATAL ORAL) 1 capsule, oral, Daily    sod sulf-pot chloride-mag sulf (Sutab) 1.479-0.188- 0.225 gram tablet Take as directed by mouth prior to colonoscopy    Synthroid 88 mcg, oral, Every morning    thyroid (pork) (ARMOUR THYROID) 15 mg, oral, Daily before breakfast    thyroid, pork, ([place] Thyroid) 30 mg tablet Take 1.5 tablets by mouth every night on an empty stomach    VITAMIN B COMPLEX ORAL 1 tablet, oral, Daily       Physical Exam  Vitals reviewed.   Constitutional:       Appearance: Normal appearance.   Cardiovascular:      Rate and Rhythm: Normal rate and regular rhythm.      Pulses: Normal pulses.      Heart sounds: Normal heart sounds.   Pulmonary:      Effort: Pulmonary effort is normal.      Breath sounds: Normal breath sounds.   Neurological:      General: No focal deficit present.      Mental Status: She is alert and oriented to person, place, and time.   Psychiatric:         Mood and Affect: Mood normal.         Behavior: Behavior normal.         Assessment & Plan  Hypothyroidism, unspecified type    Orders:    liothyronine (Cytomel) 25 mcg tablet; Take 1 tablet (25 mcg) by mouth once daily in the afternoon    levothyroxine (Synthroid, Levoxyl) 88 mcg tablet; Take 1 tablet (88 mcg) by mouth once daily in the morning.    Tsh With Reflex To Free T4 If Abnormal; Future    T3, free; Future    Vitamin D 25-Hydroxy,Total (for eval of Vitamin D levels); Future    Vitamin B12; Future    Ferritin; Future    Well adult exam

## 2024-11-11 ENCOUNTER — PHARMACY VISIT (OUTPATIENT)
Dept: PHARMACY | Facility: CLINIC | Age: 34
End: 2024-11-11
Payer: COMMERCIAL

## 2024-11-11 PROCEDURE — RXMED WILLOW AMBULATORY MEDICATION CHARGE

## 2024-11-11 RX ORDER — AMOXICILLIN 875 MG/1
TABLET, FILM COATED ORAL
Qty: 20 TABLET | Refills: 0 | OUTPATIENT
Start: 2024-11-11

## 2024-12-19 ENCOUNTER — PHARMACY VISIT (OUTPATIENT)
Dept: PHARMACY | Facility: CLINIC | Age: 34
End: 2024-12-19
Payer: COMMERCIAL

## 2024-12-19 PROCEDURE — RXMED WILLOW AMBULATORY MEDICATION CHARGE

## 2025-02-28 ENCOUNTER — PHARMACY VISIT (OUTPATIENT)
Dept: PHARMACY | Facility: CLINIC | Age: 35
End: 2025-02-28

## 2025-02-28 PROCEDURE — RXMED WILLOW AMBULATORY MEDICATION CHARGE

## 2025-04-04 LAB
25(OH)D3+25(OH)D2 SERPL-MCNC: 24 NG/ML (ref 30–100)
FERRITIN SERPL-MCNC: 37 NG/ML (ref 16–154)
T3FREE SERPL-MCNC: 3.6 PG/ML (ref 2.3–4.2)
TSH SERPL-ACNC: 3.87 MIU/L
VIT B12 SERPL-MCNC: 524 PG/ML (ref 200–1100)

## 2025-04-08 ENCOUNTER — APPOINTMENT (OUTPATIENT)
Dept: PRIMARY CARE | Facility: CLINIC | Age: 35
End: 2025-04-08
Payer: COMMERCIAL

## 2025-05-27 DIAGNOSIS — E03.9 HYPOTHYROIDISM, UNSPECIFIED TYPE: ICD-10-CM

## 2025-05-27 PROCEDURE — RXMED WILLOW AMBULATORY MEDICATION CHARGE

## 2025-05-27 RX ORDER — LEVOTHYROXINE SODIUM 88 UG/1
88 TABLET ORAL EVERY MORNING
Qty: 30 TABLET | Refills: 0 | Status: SHIPPED | OUTPATIENT
Start: 2025-05-27

## 2025-05-27 NOTE — TELEPHONE ENCOUNTER
Last office visit: 10/7/24   NO SHOWED - 4/8/25  Next office visit: not scheduled      I pended script for 1 month with NO MORE REFILLS UNTIL SEEN

## 2025-05-28 ENCOUNTER — PHARMACY VISIT (OUTPATIENT)
Dept: PHARMACY | Facility: CLINIC | Age: 35
End: 2025-05-28
Payer: COMMERCIAL

## 2025-06-27 DIAGNOSIS — E03.9 HYPOTHYROIDISM, UNSPECIFIED TYPE: ICD-10-CM

## 2025-06-30 RX ORDER — LEVOTHYROXINE SODIUM 88 UG/1
88 TABLET ORAL EVERY MORNING
Qty: 30 TABLET | Refills: 0 | Status: SHIPPED | OUTPATIENT
Start: 2025-06-30